# Patient Record
Sex: FEMALE | Race: WHITE | NOT HISPANIC OR LATINO | URBAN - METROPOLITAN AREA
[De-identification: names, ages, dates, MRNs, and addresses within clinical notes are randomized per-mention and may not be internally consistent; named-entity substitution may affect disease eponyms.]

---

## 2017-01-18 ENCOUNTER — COMMUNICATION - HEALTHEAST (OUTPATIENT)
Dept: FAMILY MEDICINE | Facility: CLINIC | Age: 47
End: 2017-01-18

## 2017-01-18 DIAGNOSIS — R87.612 LGSIL ON PAP SMEAR OF CERVIX: ICD-10-CM

## 2017-01-20 ENCOUNTER — HOSPITAL ENCOUNTER (OUTPATIENT)
Dept: MAMMOGRAPHY | Facility: CLINIC | Age: 47
Discharge: HOME | End: 2017-01-20
Attending: FAMILY MEDICINE

## 2017-01-20 DIAGNOSIS — Z12.31 VISIT FOR SCREENING MAMMOGRAM: ICD-10-CM

## 2017-01-24 ENCOUNTER — COMMUNICATION - HEALTHEAST (OUTPATIENT)
Dept: FAMILY MEDICINE | Facility: CLINIC | Age: 47
End: 2017-01-24

## 2017-01-24 DIAGNOSIS — R87.612 PAP SMEAR ABNORMALITY OF CERVIX WITH LGSIL: ICD-10-CM

## 2017-02-23 ENCOUNTER — AMBULATORY - HEALTHEAST (OUTPATIENT)
Dept: OBGYN | Facility: CLINIC | Age: 47
End: 2017-02-23

## 2017-02-23 DIAGNOSIS — Z01.812 PRE-PROCEDURE LAB EXAM: ICD-10-CM

## 2017-02-23 DIAGNOSIS — R87.612 PAP SMEAR ABNORMALITY OF CERVIX WITH LGSIL: ICD-10-CM

## 2017-02-23 ASSESSMENT — MIFFLIN-ST. JEOR: SCORE: 1453.22

## 2017-03-23 ENCOUNTER — COMMUNICATION - HEALTHEAST (OUTPATIENT)
Dept: HEALTH INFORMATION MANAGEMENT | Facility: CLINIC | Age: 47
End: 2017-03-23

## 2017-08-01 ENCOUNTER — OFFICE VISIT - HEALTHEAST (OUTPATIENT)
Dept: FAMILY MEDICINE | Facility: CLINIC | Age: 47
End: 2017-08-01

## 2017-08-01 DIAGNOSIS — J20.9 ACUTE BRONCHITIS: ICD-10-CM

## 2017-08-01 DIAGNOSIS — R09.89 ABNORMAL LUNG SOUNDS: ICD-10-CM

## 2017-08-24 ENCOUNTER — OFFICE VISIT - HEALTHEAST (OUTPATIENT)
Dept: FAMILY MEDICINE | Facility: CLINIC | Age: 47
End: 2017-08-24

## 2017-08-24 DIAGNOSIS — J20.9 ACUTE BRONCHITIS, UNSPECIFIED ORGANISM: ICD-10-CM

## 2017-09-24 ENCOUNTER — RADIANT APPOINTMENT (OUTPATIENT)
Dept: GENERAL RADIOLOGY | Facility: CLINIC | Age: 47
End: 2017-09-24
Attending: PHYSICIAN ASSISTANT
Payer: COMMERCIAL

## 2017-09-24 ENCOUNTER — OFFICE VISIT (OUTPATIENT)
Dept: URGENT CARE | Facility: URGENT CARE | Age: 47
End: 2017-09-24
Payer: COMMERCIAL

## 2017-09-24 VITALS
RESPIRATION RATE: 16 BRPM | DIASTOLIC BLOOD PRESSURE: 66 MMHG | HEART RATE: 82 BPM | WEIGHT: 172 LBS | HEIGHT: 69 IN | TEMPERATURE: 98 F | BODY MASS INDEX: 25.48 KG/M2 | SYSTOLIC BLOOD PRESSURE: 110 MMHG | OXYGEN SATURATION: 95 %

## 2017-09-24 DIAGNOSIS — M79.5 FOREIGN BODY (FB) IN SOFT TISSUE: ICD-10-CM

## 2017-09-24 DIAGNOSIS — M79.5 FOREIGN BODY (FB) IN SOFT TISSUE: Primary | ICD-10-CM

## 2017-09-24 PROCEDURE — 99202 OFFICE O/P NEW SF 15 MIN: CPT | Performed by: PHYSICIAN ASSISTANT

## 2017-09-24 PROCEDURE — 73660 X-RAY EXAM OF TOE(S): CPT | Mod: RT

## 2017-09-24 NOTE — MR AVS SNAPSHOT
After Visit Summary   9/24/2017    Maura Elder    MRN: [id number]           Patient Information     Date Of Birth          1970        Visit Information        Provider Department      9/24/2017 7:20 PM Marcia Singh PA-C [facility]        Today's Diagnoses     Foreign body (FB) in soft tissue    -  1      Care Instructions      Foreign Object Under the Skin (Removed)  An object has been removed from under your skin. Although care was taken to remove all of it, there is always a chance that a small piece may have been left behind.  Most skin wounds heal without problems. But there can be an increased risk of infection if anything stays under the skin. Sometimes the pieces work their way out on their own, and sometimes they can cause an infection. Very small pieces that stay under the skin usually don t cause a problem or need further treatment.  Home care  Wound care    Keep the wound clean and dry.    If there is a dressing or bandage, change it when it gets wet or dirty. Otherwise, leave it on for the first 24 hours, then change it once a day or as often as you were instructed.    If stitches or staples were used, clean the wound every day:    After taking off the dressing, wash the area gently with soap and water.    Apply a thin layer of antibiotic ointment to the cut. This will keep the wound clean and make it easier to remove the stitches. If it is oozing a lot, you can put a nonstick dressing over it. Then reapply the bandage or dressing as you were instructed.    You can get it wet, just like when you clean it. This means you can shower as usual for the first 24 hours, but do not soak the area in water (no baths or swimming) until the stitches or staples are taken out.    If surgical tape or strips were used, keep the area clean and dry. If it becomes wet, blot it dry with a towel.    Medicine    You can take over-the-counter medicine for pain,  unless you were given a different pain medicine to use. If you have chronic liver or kidney disease or ever had a stomach ulcer, or gastrointestinal bleeding or are taking blood thinner medicines, talk with your healthcare provider before using these medicines.    If you were given antibiotics, take them until they are used up. It is important to finish the antibiotics even if the wound looks better to make sure the infection clears.  Follow-up care  Follow up your healthcare provider, or as advised. Keep in mind the following:    Watch for any signs of infection, such as increasing pain, redness, swelling, or pus drainage. If this happens, don t wait for your scheduled visit, rather see your healthcare provider sooner.    Stitches or staples are usually taken out within 5 to 14 days. This varies depending on what part of your body they are on, and the type of wound. The healthcare provider will tell you how long they should be left in.    If surgical tape or strips were used, they are usually left on for 7 to 10 days. You can remove them after that unless you were told otherwise. If you try to remove them, and it is too difficult, soaking can help. If the edges of the cut pull apart, then stop removing the tape, and follow up with your healthcare provider.    If X-rays were taken, you will be told if there are new findings that may affect your care.  When to seek medical care  Call your healthcare provider right away if any of these occur:    Fever of 100.4 F (38 C) or higher, or as directed by your healthcare provider    Increasing pain in the wound    Redness, swelling or pus coming from the wound  Date Last Reviewed: 10/1/2016    0204-4274 [employer] [address] [place] [address], [place] [address]. All rights reserved. This information is not intended as a substitute for professional medical care. Always follow your healthcare professional's instructions.                Follow-ups after your visit       "  Your next 10 appointments already scheduled     Sep 24, 2017  8:00 PM CDT   XR FOOT RIGHT G/E 3 VIEWS with HPXR1   [facility] ([facility])    [address] [address] [phone]           Please bring a list of your current medicines to your exam. (Include vitamins, minerals and over-thecounter medicines.) Leave your valuables at home.  Tell your doctor if there is a chance you may be pregnant.  You do not need to do anything special for this exam.              Who to contact     If you have questions or need follow up information about today's clinic visit or your schedule please contact [facility] directly at [phone].  Normal or non-critical lab and imaging results will be communicated to you by MyChart, letter or phone within 4 business days after the clinic has received the results. If you do not hear from us within 7 days, please contact the clinic through [employer] or phone. If you have a critical or abnormal lab result, we will notify you by phone as soon as possible.  Submit refill requests through [employer] or call your pharmacy and they will forward the refill request to us. Please allow 3 business days for your refill to be completed.          Additional Information About Your Visit        [employer] Information     [employer] lets you send messages to your doctor, view your test results, renew your prescriptions, schedule appointments and more. To sign up, go to [email or web address] . Click on \"Log in\" on the left side of the screen, which will take you to the Welcome page. Then click on \"Sign up Now\" on the right side of the page.     You will be asked to enter the access code listed below, as well as some personal information. Please follow the directions to create your username and password.     Your access code is: [email or web address]  Expires: 2017  7:59 PM     Your access code will  in 90 days. If you need " "help or a new code, please call your [facility] clinic or [phone].        Care EveryWhere ID     This is your Care EveryWhere ID. This could be used by other organizations to access your [facility] medical records  [id number]        Your Vitals Were     Pulse Temperature Respirations Height Last Period Pulse Oximetry    82 98  F (36.7  C) (Tympanic) 16 5' 8.5\" (1.74 m) 09/07/2017 95%    BMI (Body Mass Index)                   25.77 kg/m2            Blood Pressure from Last 3 Encounters:   09/24/17 110/66   02/02/13 92/52    Weight from Last 3 Encounters:   09/24/17 172 lb (78 kg)   02/02/13 160 lb (72.6 kg)               Primary Care Provider    None Specified       No primary provider on file.        Equal Access to Services     PAULA WATTS : Matteo Wilder, wakristie luqadaha, qaybta kaalmaangelina mott, kyleigh moreno . [facility] [phone].    ATENCIÓN: Si habla español, tiene a lester disposición servicios gratuitos de asistencia lingüística. Llame al [phone].    We comply with applicable federal civil rights laws and [place] laws. We do not discriminate on the basis of race, color, national origin, age, disability sex, sexual orientation or gender identity.            Thank you!     Thank you for choosing [facility]  for your care. Our goal is always to provide you with excellent care. Hearing back from our patients is one way we can continue to improve our services. Please take a few minutes to complete the written survey that you may receive in the mail after your visit with us. Thank you!             Your Updated Medication List - Protect others around you: Learn how to safely use, store and throw away your medicines at www.disposemymeds.org.          This list is accurate as of: 9/24/17  7:59 PM.  Always use your most recent med list.                   Brand Name Dispense Instructions for use Diagnosis    oseltamivir 75 MG capsule    " TAMIFLU    10 capsule    Take 1 capsule by mouth 2 times daily.    Influenza A       PREVIFEM 0.25-35 MG-MCG per tablet   Generic drug:  norgestimate-ethinyl estradiol      Take 1 tablet by mouth daily.

## 2017-09-25 NOTE — NURSING NOTE
"Chief Complaint   Patient presents with     Urgent Care     Musculoskeletal Problem     Happened today stepped on glass on right foot unable to take it out but can feel it      /66  Pulse 82  Temp 98  F (36.7  C) (Tympanic)  Resp 16  Ht 5' 8.5\" (1.74 m)  Wt 172 lb (78 kg)  LMP 09/07/2017  SpO2 95%  BMI 25.77 kg/m2 Estimated body mass index is 25.77 kg/(m^2) as calculated from the following:    Height as of this encounter: 5' 8.5\" (1.74 m).    Weight as of this encounter: 172 lb (78 kg).  bp completed using cuff size: regular       Health Maintenance addressed:  NONE    n/a    Gale Gan MA     "

## 2017-09-25 NOTE — PROGRESS NOTES
"SUBJECTIVE:  Chief Complaint   Patient presents with     Urgent Care     Musculoskeletal Problem     Happened today stepped on glass on right foot unable to take it out but can feel it      Maura Elder is a 47 year old female who presents with a chief complaint of right foot pain.  Symptoms began today. She believes that she may have steeped on a sliver of glass. Her  tried to dig it out, but he was unsuccessful. She is having pain on the plantar aspect of her right foot.  Pain exacerbated by walking Relieved by rest.  She treated it initially with exploration.     Patient is UTD on tetanus.   History reviewed. No pertinent past medical history.  Current Outpatient Prescriptions   Medication Sig Dispense Refill     norgestimate-ethinyl estradiol (PREVIFEM) 0.25-35 MG-MCG tablet Take 1 tablet by mouth daily.       oseltamivir (TAMIFLU) 75 MG capsule Take 1 capsule by mouth 2 times daily. 10 capsule 0     Social History   Substance Use Topics     Smoking status: Never Smoker     Smokeless tobacco: Never Used     Alcohol use Not on file       ROS:  Review of systems negative except as stated below    EXAM:   /66  Pulse 82  Temp 98  F (36.7  C) (Tympanic)  Resp 16  Ht 5' 8.5\" (1.74 m)  Wt 172 lb (78 kg)  LMP 09/07/2017  SpO2 95%  BMI 25.77 kg/m2  M/S Exam:right foot piece of glass visualized, and removed. Unable to visualize glass after removal. tenderness to palpationGENERAL APPEARANCE: healthy, alert and no distress  EXTREMITIES: peripheral pulses normal  SKIN: no suspicious lesions or rashes  NEURO: Normal strength and tone, sensory exam grossly normal, mentation intact and speech normal    X-RAY was done -- No foreign body visualized    ASSESSMENT/PLAN:  1. Foreign body (FB) in soft tissue  Warm soaks and bacitracin until healed. I believe that the full piece of glass was removed, although it may be possible that part of it is still in foot.   - XR Foot Right G/E 3 Views; Future    Marcia " MONROE Singh

## 2017-09-25 NOTE — PATIENT INSTRUCTIONS
Foreign Object Under the Skin (Removed)  An object has been removed from under your skin. Although care was taken to remove all of it, there is always a chance that a small piece may have been left behind.  Most skin wounds heal without problems. But there can be an increased risk of infection if anything stays under the skin. Sometimes the pieces work their way out on their own, and sometimes they can cause an infection. Very small pieces that stay under the skin usually don t cause a problem or need further treatment.  Home care  Wound care    Keep the wound clean and dry.    If there is a dressing or bandage, change it when it gets wet or dirty. Otherwise, leave it on for the first 24 hours, then change it once a day or as often as you were instructed.    If stitches or staples were used, clean the wound every day:    After taking off the dressing, wash the area gently with soap and water.    Apply a thin layer of antibiotic ointment to the cut. This will keep the wound clean and make it easier to remove the stitches. If it is oozing a lot, you can put a nonstick dressing over it. Then reapply the bandage or dressing as you were instructed.    You can get it wet, just like when you clean it. This means you can shower as usual for the first 24 hours, but do not soak the area in water (no baths or swimming) until the stitches or staples are taken out.    If surgical tape or strips were used, keep the area clean and dry. If it becomes wet, blot it dry with a towel.    Medicine    You can take over-the-counter medicine for pain, unless you were given a different pain medicine to use. If you have chronic liver or kidney disease or ever had a stomach ulcer, or gastrointestinal bleeding or are taking blood thinner medicines, talk with your healthcare provider before using these medicines.    If you were given antibiotics, take them until they are used up. It is important to finish the antibiotics even if the wound  looks better to make sure the infection clears.  Follow-up care  Follow up your healthcare provider, or as advised. Keep in mind the following:    Watch for any signs of infection, such as increasing pain, redness, swelling, or pus drainage. If this happens, don t wait for your scheduled visit, rather see your healthcare provider sooner.    Stitches or staples are usually taken out within 5 to 14 days. This varies depending on what part of your body they are on, and the type of wound. The healthcare provider will tell you how long they should be left in.    If surgical tape or strips were used, they are usually left on for 7 to 10 days. You can remove them after that unless you were told otherwise. If you try to remove them, and it is too difficult, soaking can help. If the edges of the cut pull apart, then stop removing the tape, and follow up with your healthcare provider.    If X-rays were taken, you will be told if there are new findings that may affect your care.  When to seek medical care  Call your healthcare provider right away if any of these occur:    Fever of 100.4 F (38 C) or higher, or as directed by your healthcare provider    Increasing pain in the wound    Redness, swelling or pus coming from the wound  Date Last Reviewed: 10/1/2016    4007-7423 [employer]. [address] [place] [address], [place] [address]. All rights reserved. This information is not intended as a substitute for professional medical care. Always follow your healthcare professional's instructions.

## 2017-12-07 ENCOUNTER — AMBULATORY - HEALTHEAST (OUTPATIENT)
Dept: NURSING | Facility: CLINIC | Age: 47
End: 2017-12-07

## 2017-12-07 ENCOUNTER — RECORDS - HEALTHEAST (OUTPATIENT)
Dept: ADMINISTRATIVE | Facility: OTHER | Age: 47
End: 2017-12-07

## 2017-12-07 DIAGNOSIS — Z00.00 PREVENTATIVE HEALTH CARE: ICD-10-CM

## 2017-12-22 ENCOUNTER — OFFICE VISIT - HEALTHEAST (OUTPATIENT)
Dept: FAMILY MEDICINE | Facility: CLINIC | Age: 47
End: 2017-12-22

## 2017-12-22 DIAGNOSIS — Z00.00 ROUTINE GENERAL MEDICAL EXAMINATION AT A HEALTH CARE FACILITY: ICD-10-CM

## 2017-12-22 ASSESSMENT — MIFFLIN-ST. JEOR: SCORE: 1464.56

## 2017-12-27 LAB
HUMAN PAPILLOMA VIRUS 16 DNA: NEGATIVE
HUMAN PAPILLOMA VIRUS 18 DNA: NEGATIVE
HUMAN PAPILLOMA VIRUS FINAL DIAGNOSIS: NORMAL
HUMAN PAPILLOMA VIRUS OTHER HR: NEGATIVE
SPECIMEN DESCRIPTION: NORMAL

## 2018-01-02 LAB
BKR LAB AP ABNORMAL BLEEDING: NO
BKR LAB AP BIRTH CONTROL/HORMONES: NORMAL
BKR LAB AP CERVICAL APPEARANCE: NORMAL
BKR LAB AP GYN ADEQUACY: NORMAL
BKR LAB AP GYN INTERPRETATION: NORMAL
BKR LAB AP HPV REFLEX: NORMAL
BKR LAB AP LMP: NORMAL
BKR LAB AP PATIENT STATUS: NORMAL
BKR LAB AP PREVIOUS ABNORMAL: 2016
BKR LAB AP PREVIOUS NORMAL: 2011
HIGH RISK?: YES
PATH REPORT.COMMENTS IMP SPEC: NORMAL
RESULT FLAG (HE HISTORICAL CONVERSION): NORMAL

## 2018-05-23 ENCOUNTER — COMMUNICATION - HEALTHEAST (OUTPATIENT)
Dept: INTERNAL MEDICINE | Facility: CLINIC | Age: 48
End: 2018-05-23

## 2018-05-23 ENCOUNTER — OFFICE VISIT - HEALTHEAST (OUTPATIENT)
Dept: FAMILY MEDICINE | Facility: CLINIC | Age: 48
End: 2018-05-23

## 2018-05-23 ENCOUNTER — COMMUNICATION - HEALTHEAST (OUTPATIENT)
Dept: FAMILY MEDICINE | Facility: CLINIC | Age: 48
End: 2018-05-23

## 2018-05-23 DIAGNOSIS — R19.5 ABNORMAL STOOLS: ICD-10-CM

## 2018-05-23 LAB
ALBUMIN SERPL-MCNC: 4.1 G/DL (ref 3.5–5)
ALP SERPL-CCNC: 75 U/L (ref 45–120)
ALT SERPL W P-5'-P-CCNC: 15 U/L (ref 0–45)
ANION GAP SERPL CALCULATED.3IONS-SCNC: 10 MMOL/L (ref 5–18)
AST SERPL W P-5'-P-CCNC: 13 U/L (ref 0–40)
BASOPHILS # BLD AUTO: 0.1 THOU/UL (ref 0–0.2)
BASOPHILS NFR BLD AUTO: 1 % (ref 0–2)
BILIRUB SERPL-MCNC: 0.6 MG/DL (ref 0–1)
BUN SERPL-MCNC: 15 MG/DL (ref 8–22)
CALCIUM SERPL-MCNC: 9.4 MG/DL (ref 8.5–10.5)
CHLORIDE BLD-SCNC: 104 MMOL/L (ref 98–107)
CO2 SERPL-SCNC: 27 MMOL/L (ref 22–31)
CREAT SERPL-MCNC: 0.75 MG/DL (ref 0.6–1.1)
EOSINOPHIL # BLD AUTO: 0.1 THOU/UL (ref 0–0.4)
EOSINOPHIL NFR BLD AUTO: 1 % (ref 0–6)
ERYTHROCYTE [DISTWIDTH] IN BLOOD BY AUTOMATED COUNT: 12 % (ref 11–14.5)
GFR SERPL CREATININE-BSD FRML MDRD: >60 ML/MIN/1.73M2
GLUCOSE BLD-MCNC: 96 MG/DL (ref 70–125)
HCT VFR BLD AUTO: 37.5 % (ref 35–47)
HGB BLD-MCNC: 12.6 G/DL (ref 12–16)
LYMPHOCYTES # BLD AUTO: 5.2 THOU/UL (ref 0.8–4.4)
LYMPHOCYTES NFR BLD AUTO: 53 % (ref 20–40)
MCH RBC QN AUTO: 30.3 PG (ref 27–34)
MCHC RBC AUTO-ENTMCNC: 33.6 G/DL (ref 32–36)
MCV RBC AUTO: 90 FL (ref 80–100)
MONOCYTES # BLD AUTO: 0.4 THOU/UL (ref 0–0.9)
MONOCYTES NFR BLD AUTO: 4 % (ref 2–10)
NEUTROPHILS # BLD AUTO: 4.1 THOU/UL (ref 2–7.7)
NEUTROPHILS NFR BLD AUTO: 41 % (ref 50–70)
PLATELET # BLD AUTO: 266 THOU/UL (ref 140–440)
PMV BLD AUTO: 6.8 FL (ref 7–10)
POTASSIUM BLD-SCNC: 4.1 MMOL/L (ref 3.5–5)
PROT SERPL-MCNC: 6.1 G/DL (ref 6–8)
RBC # BLD AUTO: 4.16 MILL/UL (ref 3.8–5.4)
SODIUM SERPL-SCNC: 141 MMOL/L (ref 136–145)
WBC: 9.8 THOU/UL (ref 4–11)

## 2018-05-24 ENCOUNTER — AMBULATORY - HEALTHEAST (OUTPATIENT)
Dept: LAB | Facility: CLINIC | Age: 48
End: 2018-05-24

## 2018-05-24 DIAGNOSIS — R19.5 ABNORMAL STOOLS: ICD-10-CM

## 2018-05-25 LAB — O+P STL MICRO: NORMAL

## 2018-11-29 ENCOUNTER — AMBULATORY - HEALTHEAST (OUTPATIENT)
Dept: NURSING | Facility: CLINIC | Age: 48
End: 2018-11-29

## 2019-01-09 ENCOUNTER — OFFICE VISIT - HEALTHEAST (OUTPATIENT)
Dept: FAMILY MEDICINE | Facility: CLINIC | Age: 49
End: 2019-01-09

## 2019-01-09 DIAGNOSIS — M77.11 LATERAL EPICONDYLITIS OF RIGHT ELBOW: ICD-10-CM

## 2019-01-15 ENCOUNTER — COMMUNICATION - HEALTHEAST (OUTPATIENT)
Dept: FAMILY MEDICINE | Facility: CLINIC | Age: 49
End: 2019-01-15

## 2019-02-06 ENCOUNTER — OFFICE VISIT - HEALTHEAST (OUTPATIENT)
Dept: FAMILY MEDICINE | Facility: CLINIC | Age: 49
End: 2019-02-06

## 2019-02-06 DIAGNOSIS — Z12.31 VISIT FOR SCREENING MAMMOGRAM: ICD-10-CM

## 2019-02-06 DIAGNOSIS — Z13.1 SCREENING FOR DIABETES MELLITUS: ICD-10-CM

## 2019-02-06 DIAGNOSIS — Z00.00 ROUTINE GENERAL MEDICAL EXAMINATION AT A HEALTH CARE FACILITY: ICD-10-CM

## 2019-02-06 DIAGNOSIS — E78.5 DYSLIPIDEMIA: ICD-10-CM

## 2019-02-06 DIAGNOSIS — R59.1 LYMPHADENOPATHY: ICD-10-CM

## 2019-02-06 LAB
BASOPHILS # BLD AUTO: 0.1 THOU/UL (ref 0–0.2)
BASOPHILS NFR BLD AUTO: 1 % (ref 0–2)
CHOLEST SERPL-MCNC: 229 MG/DL
EOSINOPHIL # BLD AUTO: 0.1 THOU/UL (ref 0–0.4)
EOSINOPHIL NFR BLD AUTO: 1 % (ref 0–6)
ERYTHROCYTE [DISTWIDTH] IN BLOOD BY AUTOMATED COUNT: 13.1 % (ref 11–14.5)
FASTING STATUS PATIENT QL REPORTED: YES
FASTING STATUS PATIENT QL REPORTED: YES
GLUCOSE BLD-MCNC: 90 MG/DL (ref 70–125)
HCT VFR BLD AUTO: 38.6 % (ref 35–47)
HDLC SERPL-MCNC: 50 MG/DL
HGB BLD-MCNC: 12.6 G/DL (ref 12–16)
LDLC SERPL CALC-MCNC: 143 MG/DL
LYMPHOCYTES # BLD AUTO: 7.1 THOU/UL (ref 0.8–4.4)
LYMPHOCYTES NFR BLD AUTO: 56 % (ref 20–40)
MCH RBC QN AUTO: 29.6 PG (ref 27–34)
MCHC RBC AUTO-ENTMCNC: 32.6 G/DL (ref 32–36)
MCV RBC AUTO: 91 FL (ref 80–100)
MONOCYTES # BLD AUTO: 0.3 THOU/UL (ref 0–0.9)
MONOCYTES NFR BLD AUTO: 3 % (ref 2–10)
NEUTROPHILS # BLD AUTO: 5 THOU/UL (ref 2–7.7)
NEUTROPHILS NFR BLD AUTO: 40 % (ref 50–70)
PLATELET # BLD AUTO: 228 THOU/UL (ref 140–440)
PMV BLD AUTO: 9.2 FL (ref 8.5–12.5)
RBC # BLD AUTO: 4.26 MILL/UL (ref 3.8–5.4)
TRIGL SERPL-MCNC: 179 MG/DL
WBC: 12.7 THOU/UL (ref 4–11)

## 2019-02-06 ASSESSMENT — MIFFLIN-ST. JEOR: SCORE: 1479.31

## 2019-02-17 ENCOUNTER — COMMUNICATION - HEALTHEAST (OUTPATIENT)
Dept: FAMILY MEDICINE | Facility: CLINIC | Age: 49
End: 2019-02-17

## 2019-02-26 ENCOUNTER — COMMUNICATION - HEALTHEAST (OUTPATIENT)
Dept: FAMILY MEDICINE | Facility: CLINIC | Age: 49
End: 2019-02-26

## 2019-04-24 ENCOUNTER — HOSPITAL ENCOUNTER (OUTPATIENT)
Dept: MAMMOGRAPHY | Facility: CLINIC | Age: 49
Discharge: HOME | End: 2019-04-24
Attending: FAMILY MEDICINE

## 2019-04-24 DIAGNOSIS — Z12.31 VISIT FOR SCREENING MAMMOGRAM: ICD-10-CM

## 2019-04-26 ENCOUNTER — HOSPITAL ENCOUNTER (OUTPATIENT)
Dept: RADIOLOGY | Facility: CLINIC | Age: 49
Discharge: HOME | End: 2019-04-26
Attending: FAMILY MEDICINE

## 2019-04-26 ENCOUNTER — AMBULATORY - HEALTHEAST (OUTPATIENT)
Dept: FAMILY MEDICINE | Facility: CLINIC | Age: 49
End: 2019-04-26

## 2019-04-26 ENCOUNTER — COMMUNICATION - HEALTHEAST (OUTPATIENT)
Dept: FAMILY MEDICINE | Facility: CLINIC | Age: 49
End: 2019-04-26

## 2019-04-26 ENCOUNTER — AMBULATORY - HEALTHEAST (OUTPATIENT)
Dept: LAB | Facility: CLINIC | Age: 49
End: 2019-04-26

## 2019-04-26 ENCOUNTER — HOSPITAL ENCOUNTER (OUTPATIENT)
Dept: ULTRASOUND IMAGING | Facility: CLINIC | Age: 49
Discharge: HOME | End: 2019-04-26
Attending: FAMILY MEDICINE

## 2019-04-26 DIAGNOSIS — R59.9 ENLARGED LYMPH NODES: ICD-10-CM

## 2019-04-26 DIAGNOSIS — R05.9 COUGH: ICD-10-CM

## 2019-04-26 DIAGNOSIS — R59.1 LYMPHADENOPATHY: ICD-10-CM

## 2019-04-26 LAB
BASOPHILS # BLD AUTO: 0.1 THOU/UL (ref 0–0.2)
BASOPHILS NFR BLD AUTO: 0 % (ref 0–2)
EOSINOPHIL # BLD AUTO: 0.1 THOU/UL (ref 0–0.4)
EOSINOPHIL NFR BLD AUTO: 1 % (ref 0–6)
ERYTHROCYTE [DISTWIDTH] IN BLOOD BY AUTOMATED COUNT: 13.2 % (ref 11–14.5)
HCT VFR BLD AUTO: 37 % (ref 35–47)
HGB BLD-MCNC: 12.3 G/DL (ref 12–16)
IGA SERPL-MCNC: 283 MG/DL
IGA SERPL-MCNC: 37 MG/DL (ref 65–400)
IGM SERPL-MCNC: 8 MG/DL (ref 60–280)
LYMPHOCYTES # BLD AUTO: 9.3 THOU/UL (ref 0.8–4.4)
LYMPHOCYTES NFR BLD AUTO: 57 % (ref 20–40)
MCH RBC QN AUTO: 29.9 PG (ref 27–34)
MCHC RBC AUTO-ENTMCNC: 33.2 G/DL (ref 32–36)
MCV RBC AUTO: 90 FL (ref 80–100)
MONOCYTES # BLD AUTO: 0.5 THOU/UL (ref 0–0.9)
MONOCYTES NFR BLD AUTO: 3 % (ref 2–10)
NEUTROPHILS # BLD AUTO: 6.2 THOU/UL (ref 2–7.7)
NEUTROPHILS NFR BLD AUTO: 38 % (ref 50–70)
PATH REPORT.MICROSCOPIC SPEC OTHER STN: ABNORMAL
PLAT MORPH BLD: NORMAL
PLATELET # BLD AUTO: 259 THOU/UL (ref 140–440)
PMV BLD AUTO: 8.9 FL (ref 8.5–12.5)
RBC # BLD AUTO: 4.12 MILL/UL (ref 3.8–5.4)
WBC: 16.3 THOU/UL (ref 4–11)

## 2019-04-28 LAB
CMV IGG SERPL IA-ACNC: 2.1 AI (ref 0–0.8)
CMV IGM SERPL IA-ACNC: 0.3 AI (ref 0–0.8)
EBV EA-D IGG SER-ACNC: 0.5 AI (ref 0–0.8)
EBV NA IGG SER IA-ACNC: >8 AI (ref 0–0.8)
EBV VCA IGG SER IA-ACNC: 0.3 AI (ref 0–0.8)
EBV VCA IGM SER IA-ACNC: 0.2 AI (ref 0–0.8)

## 2019-04-29 ENCOUNTER — AMBULATORY - HEALTHEAST (OUTPATIENT)
Dept: FAMILY MEDICINE | Facility: CLINIC | Age: 49
End: 2019-04-29

## 2019-04-29 ENCOUNTER — COMMUNICATION - HEALTHEAST (OUTPATIENT)
Dept: FAMILY MEDICINE | Facility: CLINIC | Age: 49
End: 2019-04-29

## 2019-04-29 DIAGNOSIS — D72.820 ATYPICAL LYMPHOCYTOSIS: ICD-10-CM

## 2019-04-29 DIAGNOSIS — R59.1 LYMPHADENOPATHY: ICD-10-CM

## 2019-04-29 LAB
LAB AP CHARGES (HE HISTORICAL CONVERSION): ABNORMAL
PATH REPORT.COMMENTS IMP SPEC: ABNORMAL
PATH REPORT.COMMENTS IMP SPEC: ABNORMAL
PATH REPORT.FINAL DX SPEC: ABNORMAL
PATH REPORT.MICROSCOPIC SPEC OTHER STN: ABNORMAL
PATH REPORT.RELEVANT HX SPEC: ABNORMAL
RESULT FLAG (HE HISTORICAL CONVERSION): ABNORMAL

## 2019-04-30 ENCOUNTER — COMMUNICATION - HEALTHEAST (OUTPATIENT)
Dept: ONCOLOGY | Facility: HOSPITAL | Age: 49
End: 2019-04-30

## 2019-04-30 ENCOUNTER — AMBULATORY - HEALTHEAST (OUTPATIENT)
Dept: ONCOLOGY | Facility: HOSPITAL | Age: 49
End: 2019-04-30

## 2019-04-30 DIAGNOSIS — D72.820 ATYPICAL LYMPHOCYTOSIS: ICD-10-CM

## 2019-05-01 ENCOUNTER — OFFICE VISIT - HEALTHEAST (OUTPATIENT)
Dept: FAMILY MEDICINE | Facility: CLINIC | Age: 49
End: 2019-05-01

## 2019-05-01 DIAGNOSIS — I88.9 AXILLARY LYMPHADENITIS: ICD-10-CM

## 2019-05-01 DIAGNOSIS — J01.90 ACUTE SINUSITIS WITH SYMPTOMS > 10 DAYS: ICD-10-CM

## 2019-05-01 DIAGNOSIS — D72.820 ATYPICAL LYMPHOCYTOSIS: ICD-10-CM

## 2019-05-01 LAB
ALBUMIN SERPL-MCNC: 4.5 G/DL (ref 3.5–5)
ALP SERPL-CCNC: 65 U/L (ref 45–120)
ALT SERPL W P-5'-P-CCNC: 13 U/L (ref 0–45)
ANION GAP SERPL CALCULATED.3IONS-SCNC: 12 MMOL/L (ref 5–18)
AST SERPL W P-5'-P-CCNC: 11 U/L (ref 0–40)
BILIRUB SERPL-MCNC: 0.5 MG/DL (ref 0–1)
BUN SERPL-MCNC: 13 MG/DL (ref 8–22)
CALCIUM SERPL-MCNC: 10.1 MG/DL (ref 8.5–10.5)
CHLORIDE BLD-SCNC: 105 MMOL/L (ref 98–107)
CO2 SERPL-SCNC: 24 MMOL/L (ref 22–31)
CREAT SERPL-MCNC: 0.72 MG/DL (ref 0.6–1.1)
GFR SERPL CREATININE-BSD FRML MDRD: >60 ML/MIN/1.73M2
GLUCOSE BLD-MCNC: 94 MG/DL (ref 70–125)
LDH SERPL L TO P-CCNC: 136 U/L (ref 125–220)
POTASSIUM BLD-SCNC: 4 MMOL/L (ref 3.5–5)
PROT SERPL-MCNC: 6.4 G/DL (ref 6–8)
SODIUM SERPL-SCNC: 141 MMOL/L (ref 136–145)

## 2019-05-02 LAB
B BURGDOR IGG+IGM SER QL: <0.01 INDEX VALUE
LAB AP CHARGES (HE HISTORICAL CONVERSION): ABNORMAL
PATH REPORT.COMMENTS IMP SPEC: ABNORMAL
PATH REPORT.COMMENTS IMP SPEC: ABNORMAL
PATH REPORT.FINAL DX SPEC: ABNORMAL
PATH REPORT.MICROSCOPIC SPEC OTHER STN: ABNORMAL
RESULT FLAG (HE HISTORICAL CONVERSION): ABNORMAL

## 2019-05-03 LAB
GAMMA INTERFERON BACKGROUND BLD IA-ACNC: 0.03 IU/ML
M TB IFN-G BLD-IMP: NEGATIVE
MITOGEN IGNF BCKGRD COR BLD-ACNC: 0 IU/ML
MITOGEN IGNF BCKGRD COR BLD-ACNC: 0 IU/ML
QTF INTERPRETATION: NORMAL
QTF MITOGEN - NIL: 7.55 IU/ML

## 2019-05-07 ENCOUNTER — AMBULATORY - HEALTHEAST (OUTPATIENT)
Dept: INFUSION THERAPY | Facility: HOSPITAL | Age: 49
End: 2019-05-07

## 2019-05-07 ENCOUNTER — OFFICE VISIT - HEALTHEAST (OUTPATIENT)
Dept: ONCOLOGY | Facility: HOSPITAL | Age: 49
End: 2019-05-07

## 2019-05-07 DIAGNOSIS — R59.1 LYMPHADENOPATHY: ICD-10-CM

## 2019-05-07 DIAGNOSIS — D72.820 ATYPICAL LYMPHOCYTOSIS: ICD-10-CM

## 2019-05-07 DIAGNOSIS — C91.10 CLL (CHRONIC LYMPHOCYTIC LEUKEMIA) (H): ICD-10-CM

## 2019-05-07 ASSESSMENT — MIFFLIN-ST. JEOR: SCORE: 1457.31

## 2019-05-08 LAB
ALBUMIN PERCENT: 71.5 % (ref 51–67)
ALBUMIN SERPL ELPH-MCNC: 4.6 G/DL (ref 3.2–4.7)
ALPHA 1 PERCENT: 3 % (ref 2–4)
ALPHA 2 PERCENT: 10.5 % (ref 5–13)
ALPHA1 GLOB SERPL ELPH-MCNC: 0.2 G/DL (ref 0.1–0.3)
ALPHA2 GLOB SERPL ELPH-MCNC: 0.7 G/DL (ref 0.4–0.9)
B-GLOBULIN SERPL ELPH-MCNC: 0.7 G/DL (ref 0.7–1.2)
BETA PERCENT: 11.2 % (ref 10–17)
GAMMA GLOB SERPL ELPH-MCNC: 0.2 G/DL (ref 0.6–1.4)
GAMMA GLOBULIN PERCENT: 3.8 % (ref 9–20)
PATH ICD:: ABNORMAL
PATH ICD:: NORMAL
PROT PATTERN SERPL ELPH-IMP: ABNORMAL
PROT PATTERN SERPL IFE-IMP: NORMAL
PROT SERPL-MCNC: 6.4 G/DL (ref 6–8)
REVIEWING PATHOLOGIST: ABNORMAL
REVIEWING PATHOLOGIST: NORMAL

## 2019-05-09 LAB — B2 MICROGLOB TUMOR MARKER SER-MCNC: 2.4 MG/L

## 2019-05-11 LAB
MISCELLANEOUS TEST DEPT. - HE HISTORICAL: NORMAL
PERFORMING LAB: NORMAL
SPECIMEN STATUS: NORMAL
TEST NAME: NORMAL

## 2019-05-16 ENCOUNTER — HOSPITAL ENCOUNTER (OUTPATIENT)
Dept: CT IMAGING | Facility: HOSPITAL | Age: 49
Setting detail: RADIATION/ONCOLOGY SERIES
Discharge: STILL ADMITTED OR ADMINISTRATIVE | End: 2019-05-16
Attending: INTERNAL MEDICINE

## 2019-05-16 ENCOUNTER — TRANSFERRED RECORDS (OUTPATIENT)
Dept: HEALTH INFORMATION MANAGEMENT | Facility: CLINIC | Age: 49
End: 2019-05-16

## 2019-05-16 DIAGNOSIS — C91.10 CLL (CHRONIC LYMPHOCYTIC LEUKEMIA) (H): ICD-10-CM

## 2019-05-16 DIAGNOSIS — C91.90 LYMPHOID LEUKEMIA, UNSPECIFIED NOT HAVING ACHIEVED REMISSION (H): ICD-10-CM

## 2019-05-20 ENCOUNTER — OFFICE VISIT - HEALTHEAST (OUTPATIENT)
Dept: ONCOLOGY | Facility: HOSPITAL | Age: 49
End: 2019-05-20

## 2019-05-20 DIAGNOSIS — C91.10 CLL (CHRONIC LYMPHOCYTIC LEUKEMIA) (H): ICD-10-CM

## 2019-05-23 ENCOUNTER — COMMUNICATION - HEALTHEAST (OUTPATIENT)
Dept: ONCOLOGY | Facility: HOSPITAL | Age: 49
End: 2019-05-23

## 2019-05-24 ENCOUNTER — AMBULATORY - HEALTHEAST (OUTPATIENT)
Dept: ONCOLOGY | Facility: HOSPITAL | Age: 49
End: 2019-05-24

## 2019-05-24 ENCOUNTER — COMMUNICATION - HEALTHEAST (OUTPATIENT)
Dept: FAMILY MEDICINE | Facility: CLINIC | Age: 49
End: 2019-05-24

## 2019-05-24 DIAGNOSIS — I72.0 ANEURYSM OF CAROTID ARTERY (H): ICD-10-CM

## 2019-05-28 ENCOUNTER — COMMUNICATION - HEALTHEAST (OUTPATIENT)
Dept: FAMILY MEDICINE | Facility: CLINIC | Age: 49
End: 2019-05-28

## 2019-05-29 ENCOUNTER — HOSPITAL ENCOUNTER (OUTPATIENT)
Dept: CT IMAGING | Facility: CLINIC | Age: 49
Setting detail: RADIATION/ONCOLOGY SERIES
Discharge: STILL ADMITTED OR ADMINISTRATIVE | End: 2019-05-29
Attending: INTERNAL MEDICINE

## 2019-05-29 DIAGNOSIS — I72.0 ANEURYSM OF CAROTID ARTERY (H): ICD-10-CM

## 2019-05-30 ENCOUNTER — AMBULATORY - HEALTHEAST (OUTPATIENT)
Dept: ONCOLOGY | Facility: HOSPITAL | Age: 49
End: 2019-05-30

## 2019-06-02 ENCOUNTER — COMMUNICATION - HEALTHEAST (OUTPATIENT)
Dept: ONCOLOGY | Facility: HOSPITAL | Age: 49
End: 2019-06-02

## 2019-06-03 ENCOUNTER — COMMUNICATION - HEALTHEAST (OUTPATIENT)
Dept: NEUROLOGY | Facility: CLINIC | Age: 49
End: 2019-06-03

## 2019-06-03 DIAGNOSIS — Q28.2 ARTERIOVENOUS MALFORMATION OF BRAIN: ICD-10-CM

## 2019-06-05 ENCOUNTER — OFFICE VISIT - HEALTHEAST (OUTPATIENT)
Dept: FAMILY MEDICINE | Facility: CLINIC | Age: 49
End: 2019-06-05

## 2019-06-05 DIAGNOSIS — F51.02 ADJUSTMENT INSOMNIA: ICD-10-CM

## 2019-06-05 ASSESSMENT — MIFFLIN-ST. JEOR: SCORE: 1462.29

## 2019-06-10 ENCOUNTER — RECORDS - HEALTHEAST (OUTPATIENT)
Dept: ADMINISTRATIVE | Facility: OTHER | Age: 49
End: 2019-06-10

## 2019-06-18 ENCOUNTER — COMMUNICATION - HEALTHEAST (OUTPATIENT)
Dept: ONCOLOGY | Facility: HOSPITAL | Age: 49
End: 2019-06-18

## 2019-06-21 ENCOUNTER — HOSPITAL ENCOUNTER (OUTPATIENT)
Dept: INTERVENTIONAL RADIOLOGY/VASCULAR | Facility: CLINIC | Age: 49
Discharge: HOME | End: 2019-06-21
Admitting: RADIOLOGY

## 2019-06-21 ENCOUNTER — COMMUNICATION - HEALTHEAST (OUTPATIENT)
Dept: NEUROSURGERY | Facility: CLINIC | Age: 49
End: 2019-06-21

## 2019-06-21 ENCOUNTER — TRANSFERRED RECORDS (OUTPATIENT)
Dept: HEALTH INFORMATION MANAGEMENT | Facility: CLINIC | Age: 49
End: 2019-06-21

## 2019-06-21 DIAGNOSIS — Q28.2 ARTERIOVENOUS MALFORMATION OF BRAIN: ICD-10-CM

## 2019-06-21 LAB
CREAT SERPL-MCNC: 0.73 MG/DL (ref 0.6–1.1)
GFR SERPL CREATININE-BSD FRML MDRD: >60 ML/MIN/1.73M2
HCG UR QL: NEGATIVE
HGB BLD-MCNC: 12.1 G/DL (ref 12–16)
PLATELET # BLD AUTO: 219 THOU/UL (ref 140–440)

## 2019-06-21 ASSESSMENT — MIFFLIN-ST. JEOR: SCORE: 1449.59

## 2019-06-25 ENCOUNTER — OFFICE VISIT - HEALTHEAST (OUTPATIENT)
Dept: NEUROSURGERY | Facility: CLINIC | Age: 49
End: 2019-06-25

## 2019-06-25 DIAGNOSIS — Q28.2 AVM (ARTERIOVENOUS MALFORMATION) BRAIN: ICD-10-CM

## 2019-06-25 DIAGNOSIS — I72.9 ANEURYSM (H): ICD-10-CM

## 2019-06-25 ASSESSMENT — MIFFLIN-ST. JEOR: SCORE: 1448.69

## 2019-06-26 ENCOUNTER — COMMUNICATION - HEALTHEAST (OUTPATIENT)
Dept: NEUROSURGERY | Facility: CLINIC | Age: 49
End: 2019-06-26

## 2019-07-03 ENCOUNTER — HOSPITAL ENCOUNTER (OUTPATIENT)
Dept: MRI IMAGING | Facility: CLINIC | Age: 49
Discharge: HOME | End: 2019-07-03
Attending: NEUROLOGICAL SURGERY

## 2019-07-03 DIAGNOSIS — I72.9 ANEURYSM (H): ICD-10-CM

## 2019-07-03 DIAGNOSIS — Q28.2 AVM (ARTERIOVENOUS MALFORMATION) BRAIN: ICD-10-CM

## 2019-07-09 ENCOUNTER — COMMUNICATION - HEALTHEAST (OUTPATIENT)
Dept: FAMILY MEDICINE | Facility: CLINIC | Age: 49
End: 2019-07-09

## 2019-07-09 DIAGNOSIS — F51.02 ADJUSTMENT INSOMNIA: ICD-10-CM

## 2019-07-15 ENCOUNTER — COMMUNICATION - HEALTHEAST (OUTPATIENT)
Dept: ADMINISTRATIVE | Facility: HOSPITAL | Age: 49
End: 2019-07-15

## 2019-07-17 ENCOUNTER — TRANSFERRED RECORDS (OUTPATIENT)
Dept: HEALTH INFORMATION MANAGEMENT | Facility: CLINIC | Age: 49
End: 2019-07-17

## 2019-07-17 ENCOUNTER — OFFICE VISIT - HEALTHEAST (OUTPATIENT)
Dept: RADIATION ONCOLOGY | Facility: HOSPITAL | Age: 49
End: 2019-07-17

## 2019-07-17 DIAGNOSIS — Q28.2 AVM (ARTERIOVENOUS MALFORMATION) BRAIN: ICD-10-CM

## 2019-07-18 ENCOUNTER — COMMUNICATION - HEALTHEAST (OUTPATIENT)
Dept: RADIATION ONCOLOGY | Facility: HOSPITAL | Age: 49
End: 2019-07-18

## 2019-07-18 ENCOUNTER — COMMUNICATION - HEALTHEAST (OUTPATIENT)
Dept: ADMINISTRATIVE | Facility: HOSPITAL | Age: 49
End: 2019-07-18

## 2019-07-23 ENCOUNTER — RECORDS - HEALTHEAST (OUTPATIENT)
Dept: ADMINISTRATIVE | Facility: OTHER | Age: 49
End: 2019-07-23

## 2019-08-12 ENCOUNTER — RECORDS - HEALTHEAST (OUTPATIENT)
Dept: ADMINISTRATIVE | Facility: OTHER | Age: 49
End: 2019-08-12

## 2019-08-13 ENCOUNTER — OFFICE VISIT - HEALTHEAST (OUTPATIENT)
Dept: NEUROSURGERY | Facility: CLINIC | Age: 49
End: 2019-08-13

## 2019-08-13 DIAGNOSIS — I72.9 ANEURYSM (H): ICD-10-CM

## 2019-08-13 DIAGNOSIS — Q28.2 AVM (ARTERIOVENOUS MALFORMATION) BRAIN: ICD-10-CM

## 2019-08-13 ASSESSMENT — MIFFLIN-ST. JEOR: SCORE: 1448.69

## 2019-08-20 ENCOUNTER — COMMUNICATION - HEALTHEAST (OUTPATIENT)
Dept: NEUROSURGERY | Facility: CLINIC | Age: 49
End: 2019-08-20

## 2019-08-21 ENCOUNTER — COMMUNICATION - HEALTHEAST (OUTPATIENT)
Dept: RADIATION ONCOLOGY | Facility: HOSPITAL | Age: 49
End: 2019-08-21

## 2019-08-21 ENCOUNTER — OFFICE VISIT - HEALTHEAST (OUTPATIENT)
Dept: ONCOLOGY | Facility: HOSPITAL | Age: 49
End: 2019-08-21

## 2019-08-21 DIAGNOSIS — Q28.2 AVM (ARTERIOVENOUS MALFORMATION) BRAIN: ICD-10-CM

## 2019-08-22 ENCOUNTER — OFFICE VISIT - HEALTHEAST (OUTPATIENT)
Dept: ONCOLOGY | Facility: HOSPITAL | Age: 49
End: 2019-08-22

## 2019-08-22 ENCOUNTER — AMBULATORY - HEALTHEAST (OUTPATIENT)
Dept: ONCOLOGY | Facility: CLINIC | Age: 49
End: 2019-08-22

## 2019-08-22 DIAGNOSIS — C91.10 CLL (CHRONIC LYMPHOCYTIC LEUKEMIA) (H): ICD-10-CM

## 2019-08-22 DIAGNOSIS — F43.25 ADJUSTMENT REACTION WITH MIXED DISTURBANCE OF EMOTIONS AND CONDUCT: ICD-10-CM

## 2019-08-22 DIAGNOSIS — Q28.2 AVM (ARTERIOVENOUS MALFORMATION) BRAIN: ICD-10-CM

## 2019-08-22 DIAGNOSIS — I72.0 ANEURYSM OF CAROTID ARTERY (H): ICD-10-CM

## 2019-08-26 ENCOUNTER — COMMUNICATION - HEALTHEAST (OUTPATIENT)
Dept: RADIATION ONCOLOGY | Facility: HOSPITAL | Age: 49
End: 2019-08-26

## 2019-08-26 ENCOUNTER — AMBULATORY - HEALTHEAST (OUTPATIENT)
Dept: RADIATION ONCOLOGY | Facility: HOSPITAL | Age: 49
End: 2019-08-26

## 2019-08-26 DIAGNOSIS — Q28.2 AVM (ARTERIOVENOUS MALFORMATION) BRAIN: ICD-10-CM

## 2019-09-06 ENCOUNTER — COMMUNICATION - HEALTHEAST (OUTPATIENT)
Dept: RADIATION ONCOLOGY | Facility: HOSPITAL | Age: 49
End: 2019-09-06

## 2019-09-06 ENCOUNTER — AMBULATORY - HEALTHEAST (OUTPATIENT)
Dept: RADIATION ONCOLOGY | Facility: HOSPITAL | Age: 49
End: 2019-09-06

## 2019-09-06 DIAGNOSIS — Q28.2 AVM (ARTERIOVENOUS MALFORMATION) BRAIN: ICD-10-CM

## 2019-09-06 LAB — HCG UR QL: NEGATIVE

## 2019-09-09 ENCOUNTER — HOSPITAL ENCOUNTER (OUTPATIENT)
Dept: CT IMAGING | Facility: CLINIC | Age: 49
Setting detail: RADIATION/ONCOLOGY SERIES
Discharge: STILL ADMITTED OR ADMINISTRATIVE | End: 2019-09-09
Attending: RADIOLOGY

## 2019-09-09 ENCOUNTER — HOSPITAL ENCOUNTER (OUTPATIENT)
Dept: MRI IMAGING | Facility: CLINIC | Age: 49
Setting detail: RADIATION/ONCOLOGY SERIES
Discharge: STILL ADMITTED OR ADMINISTRATIVE | End: 2019-09-09
Attending: RADIOLOGY

## 2019-09-09 ENCOUNTER — TRANSFERRED RECORDS (OUTPATIENT)
Dept: HEALTH INFORMATION MANAGEMENT | Facility: CLINIC | Age: 49
End: 2019-09-09

## 2019-09-09 DIAGNOSIS — Q28.2 AVM (ARTERIOVENOUS MALFORMATION) BRAIN: ICD-10-CM

## 2019-09-16 ENCOUNTER — AMBULATORY - HEALTHEAST (OUTPATIENT)
Dept: RADIATION ONCOLOGY | Facility: HOSPITAL | Age: 49
End: 2019-09-16

## 2019-09-23 ENCOUNTER — AMBULATORY - HEALTHEAST (OUTPATIENT)
Dept: RADIATION ONCOLOGY | Facility: HOSPITAL | Age: 49
End: 2019-09-23

## 2019-09-24 ENCOUNTER — AMBULATORY - HEALTHEAST (OUTPATIENT)
Dept: RADIATION ONCOLOGY | Facility: HOSPITAL | Age: 49
End: 2019-09-24

## 2019-09-24 ENCOUNTER — OFFICE VISIT - HEALTHEAST (OUTPATIENT)
Dept: RADIATION ONCOLOGY | Facility: HOSPITAL | Age: 49
End: 2019-09-24

## 2019-09-24 DIAGNOSIS — Q28.2 AVM (ARTERIOVENOUS MALFORMATION) BRAIN: ICD-10-CM

## 2019-09-24 LAB — HCG UR QL: NEGATIVE

## 2019-10-07 ENCOUNTER — COMMUNICATION - HEALTHEAST (OUTPATIENT)
Dept: ADMINISTRATIVE | Facility: HOSPITAL | Age: 49
End: 2019-10-07

## 2019-10-07 ENCOUNTER — COMMUNICATION - HEALTHEAST (OUTPATIENT)
Dept: RADIATION ONCOLOGY | Facility: HOSPITAL | Age: 49
End: 2019-10-07

## 2019-10-08 ENCOUNTER — COMMUNICATION - HEALTHEAST (OUTPATIENT)
Dept: ONCOLOGY | Facility: HOSPITAL | Age: 49
End: 2019-10-08

## 2019-10-17 ENCOUNTER — AMBULATORY - HEALTHEAST (OUTPATIENT)
Dept: NURSING | Facility: CLINIC | Age: 49
End: 2019-10-17

## 2019-10-29 ENCOUNTER — AMBULATORY - HEALTHEAST (OUTPATIENT)
Dept: ONCOLOGY | Facility: HOSPITAL | Age: 49
End: 2019-10-29

## 2019-10-29 ENCOUNTER — OFFICE VISIT - HEALTHEAST (OUTPATIENT)
Dept: RADIATION ONCOLOGY | Facility: HOSPITAL | Age: 49
End: 2019-10-29

## 2019-10-29 ENCOUNTER — TRANSFERRED RECORDS (OUTPATIENT)
Dept: HEALTH INFORMATION MANAGEMENT | Facility: CLINIC | Age: 49
End: 2019-10-29

## 2019-10-29 DIAGNOSIS — Q28.2 AVM (ARTERIOVENOUS MALFORMATION) BRAIN: ICD-10-CM

## 2019-11-01 ENCOUNTER — COMMUNICATION - HEALTHEAST (OUTPATIENT)
Dept: ONCOLOGY | Facility: HOSPITAL | Age: 49
End: 2019-11-01

## 2019-11-08 ENCOUNTER — TELEPHONE (OUTPATIENT)
Dept: CONSULT | Facility: CLINIC | Age: 49
End: 2019-11-08

## 2019-12-19 ENCOUNTER — OFFICE VISIT (OUTPATIENT)
Dept: URGENT CARE | Facility: URGENT CARE | Age: 49
End: 2019-12-19
Payer: COMMERCIAL

## 2019-12-19 VITALS
WEIGHT: 172 LBS | SYSTOLIC BLOOD PRESSURE: 113 MMHG | BODY MASS INDEX: 26.07 KG/M2 | TEMPERATURE: 98.4 F | OXYGEN SATURATION: 98 % | DIASTOLIC BLOOD PRESSURE: 66 MMHG | HEART RATE: 64 BPM | HEIGHT: 68 IN

## 2019-12-19 DIAGNOSIS — R07.0 THROAT PAIN: Primary | ICD-10-CM

## 2019-12-19 DIAGNOSIS — J02.9 ACUTE PHARYNGITIS, UNSPECIFIED ETIOLOGY: ICD-10-CM

## 2019-12-19 LAB
DEPRECATED S PYO AG THROAT QL EIA: NORMAL
SPECIMEN SOURCE: NORMAL

## 2019-12-19 PROCEDURE — 99213 OFFICE O/P EST LOW 20 MIN: CPT | Performed by: PHYSICIAN ASSISTANT

## 2019-12-19 PROCEDURE — 87081 CULTURE SCREEN ONLY: CPT | Performed by: PHYSICIAN ASSISTANT

## 2019-12-19 PROCEDURE — 87880 STREP A ASSAY W/OPTIC: CPT | Performed by: PHYSICIAN ASSISTANT

## 2019-12-19 ASSESSMENT — MIFFLIN-ST. JEOR: SCORE: 1453.69

## 2019-12-20 NOTE — PROGRESS NOTES
"SUBJECTIVE:  Maura Elder is a 49 year old female with a chief complaint of sore throat.  Onset of symptoms was 4 day(s) ago.    Course of illness: gradual onset.  Severity moderate  Current and Associated symptoms: runny nose, stuffy nose, ear pain bilateral and fatigue  Treatment measures tried include Decongestants and Antihistamine.  Predisposing factors include None.    No past medical history on file.  Current Outpatient Medications   Medication Sig Dispense Refill     norgestimate-ethinyl estradiol (PREVIFEM) 0.25-35 MG-MCG tablet Take 1 tablet by mouth daily.       oseltamivir (TAMIFLU) 75 MG capsule Take 1 capsule by mouth 2 times daily. (Patient not taking: Reported on 12/19/2019) 10 capsule 0     Social History     Tobacco Use     Smoking status: Never Smoker     Smokeless tobacco: Never Used   Substance Use Topics     Alcohol use: Not on file       ROS:  CONSTITUTIONAL:NEGATIVE for fever, chills, change in weight  EYES: NEGATIVE for vision changes or irritation  ENT/MOUTH: nasal congestion and sore throat  RESP:NEGATIVE for significant cough or SOB    OBJECTIVE:   /66   Pulse 64   Temp 98.4  F (36.9  C) (Oral)   Ht 1.727 m (5' 8\")   Wt 78 kg (172 lb)   SpO2 98%   BMI 26.15 kg/m    GENERAL APPEARANCE: healthy, alert and no distress  EYES: EOMI,  PERRL, conjunctiva clear  HENT: ear canals and TM's normal.  Nose normal.  Pharynx erythematous with some exudate noted.  NECK: supple, non-tender to palpation, no adenopathy noted  RESP: lungs clear to auscultation - no rales, rhonchi or wheezes  CV: regular rates and rhythm, normal S1 S2, no murmur noted  SKIN: no suspicious lesions or rashes    Rapid Strep test is negative; await throat culture results.    ASSESSMENT:    1. Throat pain    - Strep, Rapid Screen    2. Acute pharyngitis, unspecified etiology      PLAN:   See orders in epic.   Symptomatic treat with gargles, lozenges, and OTC analgesic as needed. Follow-up with primary clinic if " not improving over the next week..

## 2019-12-21 LAB
BACTERIA SPEC CULT: NORMAL
SPECIMEN SOURCE: NORMAL

## 2020-01-22 ENCOUNTER — OFFICE VISIT (OUTPATIENT)
Dept: CONSULT | Facility: CLINIC | Age: 50
End: 2020-01-22
Attending: MEDICAL GENETICS
Payer: COMMERCIAL

## 2020-01-22 ENCOUNTER — OFFICE VISIT (OUTPATIENT)
Dept: CONSULT | Facility: CLINIC | Age: 50
End: 2020-01-22
Attending: GENETIC COUNSELOR, MS
Payer: COMMERCIAL

## 2020-01-22 VITALS
HEART RATE: 92 BPM | HEIGHT: 68 IN | SYSTOLIC BLOOD PRESSURE: 108 MMHG | WEIGHT: 168.43 LBS | RESPIRATION RATE: 16 BRPM | BODY MASS INDEX: 25.53 KG/M2 | DIASTOLIC BLOOD PRESSURE: 75 MMHG

## 2020-01-22 DIAGNOSIS — Q28.2 AVM (ARTERIOVENOUS MALFORMATION) BRAIN: Primary | ICD-10-CM

## 2020-01-22 PROCEDURE — 96040 ZZH GENETIC COUNSELING, EACH 30 MINUTES: CPT | Mod: ZF | Performed by: GENETIC COUNSELOR, MS

## 2020-01-22 PROCEDURE — G0463 HOSPITAL OUTPT CLINIC VISIT: HCPCS | Mod: ZF

## 2020-01-22 ASSESSMENT — MIFFLIN-ST. JEOR: SCORE: 1439.88

## 2020-01-22 ASSESSMENT — PAIN SCALES - GENERAL: PAINLEVEL: NO PAIN (0)

## 2020-01-22 NOTE — PROGRESS NOTES
Name: Maura Elder   : 1970  MRN: [id number]  Date of visit: 2020    Presenting Information:   Maura Elder is a 49 year old female referred to the [facility] due to history of let parietal lobe AVM. She was seen for a genetic counseling appointment in coordination with Dr. Dennis who completed an evaluation (see Dr. Dennis's clinic note for further documentation regarding this evaluation).  I met with the family at the request of Dr. Dennis  to obtain a personal and family history, discuss the possible genetic contributions to Maura's symptoms, and to obtain informed consent for genetic testing.    This case was co-counseled by Theodora Pulliam Genetic Counseling Intern.    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 The AVM was incidentally found during work up for her diagnosis of chronic lymphocytic leukemia (CLL). CLL is being monitored every 6 months. Maura has had contrast CT of her chest, abdomen, pelvis that did not reveal any additional AVMs. Maura reports a history of migraines with aura that began in middle school. These became less frequent as she aged until they were only occurring at during times of significant stress. She has not had a migraine for 12 years. On examination Dr. Dennis noted no obvious telangiectasias, or vascular malformations and bilaterally slightly prominent venous capillaries in the feet (Maura reports many individuals in her family have this).     She has no history of nosebleeds, strokes, limb length discrepancy, cutaneous capillary malformations, birthmarks, known vascular malformations anywhere else in the body, GI bleeding, joint hypermobility, organ rupture, arterial rupture, hernia, excessive bruising, severe myopia, chest pain, or palpitations.     Please see Dr. Dennis's note for further information regarding the  "patient's history and today's evaluations    Family History: A three generation pedigree was obtained and scanned into the electronic medical record. The relevant portions are described below:      Children- daughter, 8y, with nosebleeds in the past month. These nosebleeds occurred in the day time and lasted 1-5 minutes. Maura suspected they may be related to winter weather. She has no other health/development concerns.     Siblings- sister with no health concerns who had one stillbirth due to anencephaly.     Mother- 74y has history of pulmonary embolism and 2 hip replacements due to a birth defect.     Maternal Relatives- Grandfather with suspected pancreatic cancer diagnosed at 81y. Great grandfather with colon cancer, great aunt with \"female cancer\" another great aunt with brain cancer.     Father- with meniere's pericarditis and afib.    Paternal Relatives- Uncle with an AVm diagnosed in his 70s (unknown location), and a history of seizures (2 in last 20 years), mini strokes, afib s/p pacemaker, and MI. Another uncle  at 82 from lung cancer and had h/o multiple brain tumors that were surgically removed. Grandmother with possible ovarian cancer,  at 51y. Great uncle with colon cancer diagnosed at 80y    Family history is otherwise largely non-contributory. There were no other reports of birth defects, developmental delay, intellectual disability, recurrent pregnancy loss, stillbirth, cancer diagnosed under age 50y, early onset vision/hearing loss, or genetic testing. Maternal ancestry is [place] and paternal ancestry is [place]/Indian/[place]. Consanguinity was denied.     Genetic Counseling Discussion:  We discussed that Maura has an AVM and has a family history of AVM raising concern for an underlying genetic etiology.    Cerebral AVMs can be isolated or part of a genetic syndrome such as hereditary hemorrhagic telangiectasia (HHT) or capillary malformation-arteriovenous " malformation (CM-AVM) syndromes. HHT and CM-AVM are both autosomal dominant conditions. We reviewed features of these conditions and the significant inter- and intrafamilial variability.     We discussed basic genetic concepts including DNA, genes, and chromosomes.    We reviewed the availability of genetic testing via Next Generation Sequencing (NGS) for analysis of genes known to be associated with HHT and CM-AVM, with the aim of determining what condition is causing Jj AVM.     We went on to discuss the details, limitations, and possible outcomes of NGS. In particular, We discussed that there are three possible results from NGS:    Negative: meaning normal or no mutations are identified in the genes that were tested/sequenced    Positive: meaning a mutation that is known to be associated with a particular set of symptoms is identified    Variant of uncertain significance (VUS): meaning a change in the DNA sequence of a particular gene was seen but there is not enough information or data yet to know if it explains the symptoms. If a VUS is identified, testing of other relatives may be helpful to provide clarification.  In most cases, identification of a VUS does not confirm a diagnosis and does not result in any clinically actionable recommendations.    We discussed the potential benefits of genetic testing and why this genetic testing is medically indicated. A positive result will help determine the etiology of the AVM noted in Maura and may guide the medical management for her. For example, surveillance guidelines for individuals with CM-AVM may include but are not limited to imaging of the brain, spinal cord, heart and other areas where there are symptoms of a vascular malformation. Whereas HHT surveillance may include but is not limited to imaging of the brain, heart, liver, lungs, and screening for anemia.     Also, if a genetic cause is found for Jj AVM, it will give us a more accurate  risk assessment for other family members, particularly future children for her children, parents, sister, nieces and nephews.     Next Generation Sequencing is a well established technology utilized by all molecular genetic labs throughout the country for identifying disease-causing mutations in various genes.  Next Generation Sequencing is currently the standard of care for genetic testing of single genes.  The recommended testing for Maura  is DIAGNOSTIC testing, and it is NOT investigational.    We also reviewed limitations of NGS technology. NGS panels test a subset of our genes that are chosen based on an individual's symptoms. NGS does not test for all genetic conditions, for example NGS is unable to detect balanced chromosome rearrangements, triplet repeat disorders, or copy number variation.  Therefore, if the NGS panel is inconclusive we may consider additional testing options.     Merle wishes to pursue genetic testing today. We will discuss the genes we want to test for with Maura's cancer genetic counselor to attempt to group testing. If needed we will submit information for prior authorization and Maura will be contacted regarding the authorization status and potential cost of testing. If we proceed with testing, I will call them with the results.  An informed consent form was discussed in detail and signed by the patient.      It was a pleasure meeting Maura today. They were encouraged to reach out to me if they have any further questions.     Plan:  1. We will reachout to VIRIDIANA Mayberry to coordinate genetic testing. If needed we will initiate prior authorization  with our piror authorization team. They will call the patient with the determination once it is received.   2. Testing recommended is NGS of ACVRL1, ENG, GDF2, SMAD4, EPHB4, RASA1  3. Results will be returned by phone, and a follow-up appointment will be scheduled at that time.  4. Contact information was provided should  any questions arise in the future.      Toya Napoles MS, [facility]  Certified Genetic Counselor   [facility]  Phone: [phone]    Time spent in consultation face to face was approximately 30 minutes.    CC:No letter

## 2020-01-22 NOTE — NURSING NOTE
"Chief Complaint   Patient presents with     Consult     Genetics/AVM consult.     Vitals:    01/22/20 1255   BP: 108/75   BP Location: Left arm   Patient Position: Chair   Pulse: 92   Resp: 16   Weight: 168 lb 6.9 oz (76.4 kg)   Height: 5' 8.15\" (173.1 cm)   HC: 56.5 cm (22.24\")      Makeda Bolaños M.A.  January 22, 2020  "

## 2020-01-22 NOTE — PATIENT INSTRUCTIONS
Genetics  [facility]     Contact our nurse coordinator at [phone] or send a [employer] message for any non-urgent general or medical questions.     If you had genetic testing and have further questions, please contact the genetic counselor who saw you during your visit.    Toya Napoles  Ph: [phone]    To schedule appointments:  Pediatric Call Center for [facility]: [phone]  Neuropsychology [phone]  Radiology/ Imaging/Echocardiogram: [phone]   Services:   [phone]     Please consider signing up for [employer] for easy and confidential communication. Please sign up at the clinic  or go to [email or web address].

## 2020-01-22 NOTE — PROGRESS NOTES
GENETICS CLINIC CONSULTATION     Name:  Maura Elder  :   1970  MRN:   [id number]  Date of service: 2020  Primary Care Provider: Gale Rizo MD  Referring Provider: Maura Giordano MD    Dear Dr. Maura Giordano,      We had the pleasure of seeing your patient in Genetics Clinic today.     Reason for consultation:  A consultation in the [facility] was requested for Maura, a 49 year old female, for evaluation of cerebral AVM.      Maura was not accompanied to this visit by her family members.  She also saw our genetic counselor at this visit.       History is obtained from Patient and EMR    Assessment:    Maura Elder is a 49 year old female history of chronic lymphocytic leukemia with incidentally detected cerebral AVM.      Cerebral AVMs are seen both in sporadic cases as well as in genetic syndromes.     Cerebral AVMs occur most frequently as an isolated finding, but may be a manifestation of Hereditary hemorrhagic telangiectasia (HHT) or another dominantly inherited vascular dysplasia such as capillary malformation-arteriovenous malformation (CM-AVM) caused by pathogenic variants. Families have also been reported with autosomal dominant AVMs of the brain and no other features of HHT. However, the presence of multiple cerebral AVMs is highly suggestive of HHT. In the brain, AVMs are typically present at birth. Cerebral AVMs occur in approximately 10% of individuals with HHT. The absence of a family history of recurrent nosebleed and presence of telangiectases specifically on the lips, face, and hands best distinguish other vascular dysplasias from HHT.    Intracranial aneurysms can be associated with cerebral AVMs. The incidence of aneurysms in patients harboring an AVM is higher than the incidence of aneurysms in the general population. Maura has no history suggestive of a connective tissue disorder     We discussed testing for  genes associated with HHT and CV-AVM: ACVRL1, ENG, GDF2, SMAD4, EPHB4, RASA1. We will reach out to   Kelsey Hutchinson MS, [facility] (Licensed Genetic Counselor, [facility]) and coordinate testing.     Maura verbalized understanding and agreed with the plan above.    Plan:    1. Ordered at this visit:    Preauthorization for panel testing for arteriovenous malformation including the following genes: ACVRL1, ENG, GDF2, SMAD4, EPHB4, RASA1.   2. Genetic counseling consultation with Toya Napoles MS, [facility]  3. Follow up: Return in about 6 months (around 7/22/2020).     History of Present Illness:  Maura Elder is a 49 year old female history of chronic lymphocytic  leukemia with incidentally detected single cerebral AVM.  This is located in the left parietal lobe.  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     CLL was originally discovered through axillary adenopathy that was present on mammogram in April 2019.  Further work-up included CT soft tissue neck on 5/16/2019 with incidentally discovered 8 x9 millimeter cavernous ICA aneurysm.  Further evaluation with CTA showed an AVM in the left superior left frontal parietal area measuring approximately 2 x 1 cm.  Subsequent IR cerebral angiogram showed the AVM to measure 24.8x 10.1x 6.7 mm nidus in the left parietal lobe, no deep venous drainage.  Spetzler Alex Grading Scale, grade 1 AVM. Functional MRI was obtained 7/3/2019. She is being followed by neurosurgery. She underwent stereotactic radiosurgery on 9/24/2019.  This plan to repeat imaging end of March.    Maura has had contrast CT of her chest, abdomen, pelvis: did not reveal any AVMs.    No history of limb length discrepancy, cutaneous capillary malformations, birthmarks, known vascular malformations anywhere else in the body.  No history of recurrent, spontaneous nosebleeds.  No history of GI bleeding.  There is no history of headaches, vision changes  or stroke.  No history of joint hypermobility, organ rupture, arterial rupture, hernia, excessive bruising, severe myopia, chest pain, palpitations.    There is no history of tobacco use.    Regarding her CLL, she is following up closely with hematology/oncology.  No current treatments. She has been seen by cancer genetic counselor, Kelsey Hutchinson [facility].  There is plan to send genetic testing on skin biopsy.    Pertinent studies/abnormal test results:     Imaging results:   7/3/2019 MR head functional:   #1 Left parietal arteriovenous malformation with superficial nidus measuring up to 2.7 cm in AP dimension.  #2 arterial supply from left middle cerebral artery branches is better described on the previous conventional angiogram  #3 dominant venous drainage cephalad to the superior sagittal sinus with associated venous aneurysm measuring up to 1 cm  #4 DTI demonstrates no significant white matter tract involvement by the superficial arteriovenous malformation  Functional MRI  #1 left hemisphere dominance for both expressive and receptive language, vernix activation along the posterior perisylvian region is approximately 1.5 to 2 cm retinitis.  #2 expected somatic tropic distribution of motor activation for finger and lip/tongue paradigms.  Contralateral activation of foot paradigm.  The activation of finger tapping is located just anterior to the dominant draining vein and venous aneurysm.    6/21/2019, cervical angiogram  #1: AVM with a diffuse 24.8x10.1x6.7 mm nidus.  The anterior compartment of the nidus is more compact and measures 5.2x 10.1x 6.7 mm.  Arterial supply arises primarily from 3 hypertrophied right MCA branches, with the more anterior 2 branches supplying the compact component of the nidus.  All branches contribute innumerable pedicles with a combination of direct and and en passage supply.  No evidence of perinidal or definite intranidal aneurysm.  There are 2 primary venous outlets.  The dominant  venous outlet arises from the superior aspect of the compact portion of the nidus.  There is rapid shunting into this markedly ectatic vein, which drains into the superior sagittal sinus.  A venous aneurysm arises from this vein immediately as it exits the nidus measuring 9.5 x 8.2 x 10.4 mm.  An additional minor draining vein arises from the more diffuse posterior aspect of the nidus, converging with a larger vein before entering the superior sagittal sinus.  No evidence of venous outflow stenosis.  No deep venous drainage.    #2: 11.6 x 9.4 x 10.3 mm inferiorly projecting saccular extradural aneurysm of the left internal carotid artery cavernous segment with a relatively narrow 4.5 mm neck.    #3:  no evidence of additional intracranial aneurysm.   .     Pregnancy/ History:  Maura was born at term gestation via vaginal delivery.     Past Medical History:  Migraine- front of head- during puberty. Not since the past 12 years.   Myopia: -2.25 bilaterally    Past Surgical History:  No past surgical history on file.    Medications:  Current Outpatient Medications   Medication Sig Dispense Refill     norgestimate-ethinyl estradiol (PREVIFEM) 0.25-35 MG-MCG tablet Take 1 tablet by mouth daily.       oseltamivir (TAMIFLU) 75 MG capsule Take 1 capsule by mouth 2 times daily. (Patient not taking: Reported on 2019) 10 capsule 0   Green tea extract  Probiotic  Calcium    Allergies:  Allergies   Allergen Reactions     Penicillins      Immunization:  Up-to-date per patient    Diet:  Regular    Care team:  Patient Care Team       Relationship Specialty Notifications Start End    Gale Rizo PCP - General Family Practice  20     Phone: [phone] Fax: [phone]         [address] [address]      Neurosurgery  MD Maura Faustin MD (Radiation Oncology, [facility])  Meghann Waters MD (Hematology and Oncology, [facility]  Kelsey Hutchinson MS, [facility] (Licensed Genetic Counselor, [facility])    Developmental/Educational History:  No previous history of developmental delay.     Review of Systems:  GENERAL:  Denies: Fatigue, Fever, Recent weight change  EYES: Stye. Myopia Denies:  blindness, Hx of eye surgery  EARS: Denies: Hearing impairment or deafness, Frequent ear infections, Tinnitus, Vertigo  NOSE/MOUTH/THROAT: Sore throat.  Denies: Epistaxis, Anosmia, Dental problems, Hx of cleft lip or palate, Difficulty swallowing  RESPIRATORY: Cough.  Denies: Asthma, Dyspnea Frequent pneumonias  CARDIOVASCULAR: Denies: Murmur, High or Low BP, Syncope, Cyanosis, Edema  HEMATOLOGY/LYMPHATIC: chronic lymphocytic leukemia   GASTROINTESTINAL: Denies: Nausea or vomiting, Bloody stool, Jaundice, Heartburn or Indigestion   MUSCULOSKELETAL: Denies: Joint laxity, Joint pain or stiffness, Limb abnormalities   RENAL/URINARY: Denies: Hx of kidney stones, Dysuria, Hematuria, Incontinence, Frequent UTI's   NEUROLOGICAL: Denies: Hx of seizures, Headaches, Gait abnormalities, Numbness or tingling, Memory loss   ENDOCRINE: Denies: Heat or Cold intolerance, Polydipsia, Polyphagia, Thyroid problems, Diabetes, Hirsutism   INTEGUMENT: Acne.  Denies: Birthmarks, Rashes, Hypo - or Hyperpigmented macules, Excessively \"stretchy\" skin, Keloids, Poor wound healing    Family History:    A detailed pedigree was obtained by the genetic counselor at the time of this appointment and is scanned into the electronic medical record. I personally reviewed and discussed the pedigree with the GC and the family and concur with the GC note. Please refer to the formal pedigree for full details.     Daughter- has h/o day time nose bleeds x2. 1st episide: lasted for about 5 min. 2nd episode lasted a couple of minutes. Scant bleeding. Maura believes this is because of weather.     Social History:  Lives with , daughter.     Physical Examination:  Blood " "pressure 108/75, pulse 92, resp. rate 16, height 5' 8.15\" (173.1 cm), weight 168 lb 6.9 oz (76.4 kg), head circumference 56.5 cm (22.24\"), not currently breastfeeding.    Pictures taken during the visit: no    General: WDWN in NAD, appears stated age, non-dysmorphic  Head and Face: NCAT  Ears: Well-formed, normal in position and placement, canals patent  Eyes: Normal in position and placement, EOMI; lids, lashes, and brows unremarkable  Nose: Nares patent  Mouth/Throat: Lips, philtrum, palate, dentition unremarkable  Neck: No pits, tags, fissures  Chest: Symmetric, Raza stage 5  Respiratory: Clear to auscultation bilaterally  Cardiovascular: Regular rate and rhythm with no murmur  Abdomen: Nondistended, soft, nontender, no hepatosplenomegaly  Genitourinary: deferred  Extremities/Musculoskeletal: Symmetrical; full ROM; hands, feet, nails, palmar and plantar creases unremarkable.  Negative thumb and wrist sign.  No obvious limb length discrepancy.  No obvious spine deformities.  Neurologic: Mental status appropriate for age; good tone, strength, and muscle bulk  Skin: No obvious telangiectasias, or vascular malformations.  Bilaterally slightly prominent venous capillaries in the feet (Maura reports many individuals in her family have this)         Thank you for allowing us to participate in the care of Maura Elder. Please do not hesitate to contact us with questions.            Serina Dennis MD    Division of Genetics and Metabolism  Department of Pediatrics        Route to  Gale Rizo, Referred Self    "

## 2020-02-07 ENCOUNTER — OFFICE VISIT - HEALTHEAST (OUTPATIENT)
Dept: FAMILY MEDICINE | Facility: CLINIC | Age: 50
End: 2020-02-07

## 2020-02-07 DIAGNOSIS — Z00.00 ROUTINE GENERAL MEDICAL EXAMINATION AT A HEALTH CARE FACILITY: ICD-10-CM

## 2020-02-07 DIAGNOSIS — J01.90 ACUTE SINUSITIS WITH SYMPTOMS > 10 DAYS: ICD-10-CM

## 2020-02-07 DIAGNOSIS — R87.618 BENIGN-APPEARING ENDOMETRIAL CELLS ON CERVICAL PAP SMEAR: ICD-10-CM

## 2020-02-07 ASSESSMENT — MIFFLIN-ST. JEOR: SCORE: 1444.83

## 2020-02-10 ENCOUNTER — COMMUNICATION - HEALTHEAST (OUTPATIENT)
Dept: FAMILY MEDICINE | Facility: CLINIC | Age: 50
End: 2020-02-10

## 2020-02-10 DIAGNOSIS — Z13.1 SCREENING FOR DIABETES MELLITUS: ICD-10-CM

## 2020-02-10 DIAGNOSIS — E78.5 DYSLIPIDEMIA: ICD-10-CM

## 2020-02-10 LAB
HPV SOURCE: NORMAL
HUMAN PAPILLOMA VIRUS 16 DNA: NEGATIVE
HUMAN PAPILLOMA VIRUS 18 DNA: NEGATIVE
HUMAN PAPILLOMA VIRUS FINAL DIAGNOSIS: NORMAL
HUMAN PAPILLOMA VIRUS OTHER HR: NEGATIVE
SPECIMEN DESCRIPTION: NORMAL

## 2020-02-17 ENCOUNTER — COMMUNICATION - HEALTHEAST (OUTPATIENT)
Dept: FAMILY MEDICINE | Facility: CLINIC | Age: 50
End: 2020-02-17

## 2020-02-18 LAB
BKR LAB AP ABNORMAL BLEEDING: NO
BKR LAB AP BIRTH CONTROL/HORMONES: NORMAL
BKR LAB AP CERVICAL APPEARANCE: NORMAL
BKR LAB AP GYN ADEQUACY: NORMAL
BKR LAB AP GYN INTERPRETATION: NORMAL
BKR LAB AP GYN OTHER FINDINGS: NORMAL
BKR LAB AP HPV REFLEX: NORMAL
BKR LAB AP LMP: NORMAL
BKR LAB AP PATIENT STATUS: NORMAL
BKR LAB AP PREVIOUS ABNORMAL: 2016
BKR LAB AP PREVIOUS NORMAL: 2017
HIGH RISK?: YES
PATH REPORT.COMMENTS IMP SPEC: NORMAL
RESULT FLAG (HE HISTORICAL CONVERSION): NORMAL

## 2020-02-20 ENCOUNTER — COMMUNICATION - HEALTHEAST (OUTPATIENT)
Dept: FAMILY MEDICINE | Facility: CLINIC | Age: 50
End: 2020-02-20

## 2020-03-30 ENCOUNTER — COMMUNICATION - HEALTHEAST (OUTPATIENT)
Dept: ADMINISTRATIVE | Facility: HOSPITAL | Age: 50
End: 2020-03-30

## 2020-04-17 ENCOUNTER — COMMUNICATION - HEALTHEAST (OUTPATIENT)
Dept: ADMINISTRATIVE | Facility: HOSPITAL | Age: 50
End: 2020-04-17

## 2020-07-06 DIAGNOSIS — Q28.2 AVM (ARTERIOVENOUS MALFORMATION) BRAIN: Primary | ICD-10-CM

## 2020-07-23 ENCOUNTER — TELEPHONE (OUTPATIENT)
Dept: CONSULT | Facility: CLINIC | Age: 50
End: 2020-07-23

## 2020-07-23 NOTE — TELEPHONE ENCOUNTER
I called 7/23/2020 to update Maura on insurance coverage for her genetic testing (no PA was required). However, I was unable to reach Maura.  I left a non-detailed voicemail with my name and phone number.    CHANDA Cool  Genomics Billing    [facility]   Molecular Diagnostics Laboratory  [address] [place] [id number]  [phone]

## 2020-07-30 ENCOUNTER — HOSPITAL ENCOUNTER (OUTPATIENT)
Dept: MRI IMAGING | Facility: CLINIC | Age: 50
Setting detail: RADIATION/ONCOLOGY SERIES
Discharge: STILL ADMITTED OR ADMINISTRATIVE | End: 2020-07-30
Attending: RADIOLOGY

## 2020-07-30 DIAGNOSIS — Q28.2 AVM (ARTERIOVENOUS MALFORMATION) BRAIN: ICD-10-CM

## 2020-08-04 NOTE — TELEPHONE ENCOUNTER
I called 8/3/2020 to notify Maura that no prior authorization is required for her genetic testing. Maura said she received a letter from her insurance stating testing was approved. I asked her to provide a copy of the letter she received and I will check benefits once I confirm testing was approved. Maura had no further questions.    CHANDA Cool  Genomics Billing    [facility]   Molecular Diagnostic Lab  [address] [place] [id number]  [phone]

## 2020-08-10 ENCOUNTER — COMMUNICATION - HEALTHEAST (OUTPATIENT)
Dept: ADMINISTRATIVE | Facility: HOSPITAL | Age: 50
End: 2020-08-10

## 2020-08-10 NOTE — TELEPHONE ENCOUNTER
"Maura sent an e-mail on 8/7 stating she does not want to proceed with testing at this time.     \"Adrien Avalos,    Thank you for your phone call the other day regarding genetic testing. [facility] and confirmed that they are willing to pay for the genetic testing; however I ve decided that at this time I don t want to pursue genetic testing. Thank you so much for your assistance and help with my case.    Many thanks,    - Maura Elder\"  "

## 2020-08-11 ENCOUNTER — OFFICE VISIT - HEALTHEAST (OUTPATIENT)
Dept: RADIATION ONCOLOGY | Facility: HOSPITAL | Age: 50
End: 2020-08-11

## 2020-08-11 DIAGNOSIS — Q28.2 AVM (ARTERIOVENOUS MALFORMATION) BRAIN: ICD-10-CM

## 2021-02-11 ENCOUNTER — HOSPITAL ENCOUNTER (OUTPATIENT)
Dept: MRI IMAGING | Facility: CLINIC | Age: 51
Discharge: HOME | End: 2021-02-11
Attending: RADIOLOGY

## 2021-02-11 DIAGNOSIS — Q28.2 AVM (ARTERIOVENOUS MALFORMATION) BRAIN: ICD-10-CM

## 2021-02-16 ENCOUNTER — OFFICE VISIT - HEALTHEAST (OUTPATIENT)
Dept: RADIATION ONCOLOGY | Facility: HOSPITAL | Age: 51
End: 2021-02-16

## 2021-02-16 DIAGNOSIS — Q28.2 AVM (ARTERIOVENOUS MALFORMATION) BRAIN: ICD-10-CM

## 2021-03-19 ENCOUNTER — AMBULATORY - HEALTHEAST (OUTPATIENT)
Dept: NURSING | Facility: CLINIC | Age: 51
End: 2021-03-19

## 2021-04-09 ENCOUNTER — AMBULATORY - HEALTHEAST (OUTPATIENT)
Dept: NURSING | Facility: CLINIC | Age: 51
End: 2021-04-09

## 2021-05-28 NOTE — PROGRESS NOTES
[facility] Hematology and Oncology Progress Note    Patient: Maura Elder  MRN: [id number]  Date of Service: 05/20/2019        Reason for Visit    Chief Complaint   Patient presents with     HE Cancer       Assessment and Plan    CLL, Martinez stage 2/ Binet B  Peripheral lymphocytosis and lymphadenopathy   Hypogammaglobulinemia  Fish studies show deletion 13 q. in 47.5% of interface nuclei  Beta-2 microglobulin of 2.4    Lab results are reviewed and confirm the diagnosis of CLL with FISH studies as above.  CT scans do show evidence of cervical, supraclavicular, axillary and retroperitoneal adenopathy and also left inguinal adenopathy with borderline splenomegaly.    Discussed with patient and  that this appears to be consistent with Martinez stage II disease.    Discussed natural history of CLL.  Discussed potential risk for transformation into more aggressive lymphoma.  Discussed symptoms of progression including more adenopathy and eventually anemia and thrombocytopenia.    Discussed indications for treatment.  Discussed possibility of autoimmune hemolytic anemia or autoimmune thrombocytopenia.    Discussed symptoms that she needs to watch for and call us about including combination of fevers, night sweats and unexplained weight loss, new significant fatigue, painful enlarging adenopathy and also new bruising or bleeding.    Discussed use of EGCG to help reduce rate of progression of disease.  Discussed that we do not have any clinical trials at this time for treatment.    Discussed that there is no indication for immediate treatment at this time and that we will plan to observe patient about every 3 months with lab work.    She had questions about prognosis and based on her current staging median survival is between 6 to 9 years with a range of up to 20 years and this may not take into consideration the impact of new oral therapies.    40 minutes spent majority in counseling and coordination of  care.    Plan: Continue observation for now  Follow-up in 6 weeks with repeat CBC    Measurable disease: CBC, flow cytometry, CT scans, quantitative immunoglobulins; FISH studies      ECOG Performance   ECOG Performance Status: 0    Distress Assessment  Distress Assessment Score: Extreme distress(recent scan)    Pain  Pain Score (Initial OR Reassessment): 1        Problem List    1. CLL (chronic lymphocytic leukemia) (H)  HM1(CBC and Differential)        CC: Gale Rizo MD    ______________________________________________________________________________    History of Present Illness    Ms. Maura Elder returns for follow-up.  She has undergone CT scans and lab work and is here to review results.  Still having some tenderness in the neck.  Also some fatigue related to her periods.  ECOG status remains 0.    Pain Status  Currently in Pain: Yes    Review of Systems    Constitutional  Constitutional (WDL): All constitutional elements are within defined limits  Neurosensory  Neurosensory (WDL): All neurosensory elements are within defined limits  Cardiovascular  Cardiovascular (WDL): All cardiovascular elements are within defined limits  Pulmonary  Respiratory (WDL): Within Defined Limits  Gastrointestinal  Gastrointestinal (WDL): All gastrointestinal elements are within defined limits  Genitourinary  Genitourinary (WDL): All genitourinary elements are within defined limits  Integumentary  Integumentary (WDL): All integumentary elements are within defined limits  Patient Coping  Patient Coping: Accepting  Distress Assessment  Distress Assessment Score: Extreme distress(recent scan)  Accompanied by  Accompanied by: Family Member    Past History  Past Medical History:   Diagnosis Date     LOIS I (cervical intraepithelial neoplasia I)      HPV (human papilloma virus) infection      Shingles          Past Surgical History:   Procedure Laterality Date     APPENDECTOMY  2012      SECTION  2011      NO PAST SURGERIES         Physical Exam    Recent Vitals 5/20/2019   Height -   Weight 173 lbs   BSA (m2) 1.94 m2   /73   Pulse 95   Temp 98.5   Temp src 1   SpO2 100   Some recent data might be hidden       GENERAL: Alert and oriented to time place and person. Seated comfortably. In no distress.    HEAD: Atraumatic and normocephalic.    EYES: CASEY, EOMI.  No pallor.  No icterus.    Oral cavity: no mucosal lesion or tonsillar enlargement.    NECK: supple. JVP normal.  No thyroid enlargement.    LYMPH NODES: No palpable, cervical, axillary or inguinal lymphadenopathy.    CHEST: clear to auscultation bilaterally.  Resonant to percussion throughout bilaterally.  Symmetrical breath movements bilaterally.    CVS: S1 and S2 are heard. Regular rate and rhythm.  No murmur or gallop or rub heard.  No peripheral edema.    ABDOMEN: Soft. Not tender. Not distended.  No palpable hepatomegaly or splenomegaly.  No other mass palpable.  Bowel sounds heard.    EXTREMITIES: Warm.    SKIN: no rash, or bruising or purpura.  Has a full head of hair.      Lab Results    No results found for this or any previous visit (from the past 168 hour(s)).    Imaging    Xr Chest 2 Views    Result Date: 4/26/2019  EXAM: XR CHEST 2 VIEWS LOCATION: [facility] DATE/TIME: 4/26/2019 4:30 PM INDICATION: Cough COMPARISON: None. FINDINGS: Negative chest.    Ct Soft Tissue Neck With Contrast    Result Date: 5/17/2019  EXAM: CT SOFT TISSUE NECK W CONTRAST LOCATION: [facility] DATE/TIME: 5/16/2019 12:18 PM INDICATION: CLL COMPARISON: None. CONTRAST: 100 mL Omnipaque 350 TECHNIQUE: Routine with IV contrast. Multiplanar reformats. Dose reduction techniques were used. FINDINGS: VISUALIZED INTRACRANIAL/ORBITS/SINUSES: 8 x 9 mm cavernous left ICA aneurysm. Visualized paranasal sinuses and mastoid air cells are clear. SUPRAHYOID NECK: Normal nasopharynx and oropharynx. Normal parapharyngeal and  spaces. Normal oral cavity and  floor of mouth structures. INFRAHYOID NECK: Normal supraglottic larynx, vocal cords, and hypopharynx. SALIVARY GLANDS: Multiple enlarged intraparotid lymph nodes bilaterally. Submandibular glands are normal. LYMPH NODES: Diffuse enlargement of anterior and posterior chain cervical lymph nodes, supraclavicular lymph nodes, and visualized axillary/retropectoral lymph nodes. For example, the dominant right level 2A lymph node measures 1.4 x 2.1 cm and a dominant right level 1B lymph node measures 1.3 x 2.5 cm. CAROTID SHEATH: Vascular structures of the neck are patent. THYROID: Normal. OTHER: Multilevel cervical spondylosis including moderate interbody degeneration at C5-C6 and C6-C7. The included lung apices are clear.     CONCLUSION: 1.  Diffuse enlargement of cervical lymph nodes consistent with the given history of CLL. 2.  8 x 9 mm cavernous left ICA aneurysm. Recommend dedicated head MRA or CTA for more detailed evaluation. NOTE: ABNORMAL REPORT THE DICTATION ABOVE DESCRIBES AN ABNORMALITY FOR WHICH FOLLOW-UP IS NEEDED.     Mammo Screening Bilateral    Result Date: 4/24/2019  [facility] BILATERAL SCREENING MAMMOGRAM 4/24/2019 4:06 PM INDICATION: Breast cancer screening. COMPARISON: 01/20/2017, 09/04/2014 and 06/09/2010. TECHNIQUE: Bilateral screening full-field digital mammography with computer-aided detection. FINDINGS: There are scattered areas of fibroglandular density. Both breasts are negative. However, the partially visualized right and left axillary lymph nodes appear larger than at prior mammograms. Recommend bilateral axillary ultrasound. Our staff will contact the patient for additional imaging. ACR BI-RADS Category 0: Incomplete: Need Additional Imaging Evaluation and/or Prior Mammograms for Comparison.    Ct Chest Abdomen Pelvis Without Oral With Iv Contrast    Result Date: 5/16/2019  EXAM: CT CHEST ABDOMEN PELVIS WO ORAL W IV CONTRAST LOCATION: [facility] DATE/TIME: 5/16/2019  12:21 PM INDICATION: CLL. Stage I. COMPARISON: CT abdomen and pelvis 12/19/2012 TECHNIQUE: Helical thin-section CT scan of the chest, abdomen, and pelvis was performed following injection of IV contrast. Multiplanar reformats were obtained. Dose reduction techniques were used. CONTRAST: Iohexol (Omni) 100 mL FINDINGS: CHEST: Lungs are clear. No pleural effusions. Mild bilateral axillary lymphadenopathy. Largest right axillary node measures 2.5 x 1.5 cm, image 78. Largest left axillary node measures 2.5 x 1.8 cm, image 76. Minimally prominent mediastinal nodes. Subcarinal node measures 1.3 cm.  ABDOMEN: Mild splenomegaly measuring 12 cm. Normal liver, kidneys, pancreas, and adrenal glands. Mild retroperitoneal lymphadenopathy. Largest conglomeration of left periaortic nodes measure 3.1 x 2.2 cm on image 310. PELVIS: Mild iliac and inguinal lymphadenopathy. Largest left external iliac node on image 448 measures 3.1 x 1.6 cm. No adnexal masses. MUSCULOSKELETAL: Negative.     CONCLUSION: 1.  Mild lymphadenopathy in the axilla, retroperitoneum, and pelvis. Minimally prominent nodes in the mediastinum. 2.  Mild splenomegaly. 3.  No other significant findings.    Us Breast Limited (focal) Bilateral    Result Date: 4/26/2019  UNILATERAL US BREAST LIMITED (FOCAL) BILATERAL 4/26/2019 3:41 PM INDICATION: Enlarged bilateral axillary lymph nodes-call back. COMPARISON: Mammograms from 4/24/2019, 1/20/2017, 9/4/2014, and 6/9/2010. ULTRASOUND FINDINGS: Bilateral axillary ultrasound was performed by both the ultrasonographer and the radiologist. Enlarged lymph nodes with thickened cortices are seen in the axilla bilaterally. One on the right measures 2.2 x 1.0 x 2.6 cm. One on the left measures 2.2 x 2.4 cm. Another measures 3.2 x 1.3 x 3 cm. Several similar-sized lymph nodes are present in the axilla bilaterally.     Enlarged lymph nodes with thickened cortices in the axilla bilaterally. There is a broad differential diagnosis  including autoimmune disease, lymphoma, leukemia, and severe chronic infection. No abnormality is seen in the breast to suggest that this would be bilateral metastatic breast cancer. Further evaluation with physical exam, laboratory evaluation, and possible CTs as well as biopsy-confirmed benign cause is recommended. The findings and recommendations were discussed with patient and Dr. Hopson at the time of exam. Dr. Hopson then had an opportunity to speak to the patient prior to discharge from the Breast Center. ACR BI-RADS Category 4: Suspicious. Results given to the patient.        Signed by: Meghann Waters MD

## 2021-05-28 NOTE — TELEPHONE ENCOUNTER
Telephoned and spoke with Maura to assist in scheduling oncology referral as ordered by Dr. Hopson.  Patient has recent atypical lymphocytosis, and lymphadenopathy.  Writer discussed with Dr. Artis who placed orders for LDH & CMP to be drawn at primary clinic.  Patient indicates she will visit [facility] tomorrow and she will call ahead to make sure labs can be drawn at that time.     Consult appointment created with Dr. Waters on Tuesday, 5/7/19 at 11:30am (11:00am arrival).  Welcome letter, health history form, and medications/allergies list will be e-mailed to patient today. Blanca Lizama RN

## 2021-05-28 NOTE — PROGRESS NOTES
"Patient is a 48-year-old female presents today for evaluation of her axillary lymphadenopathy.    She was seen on February 6 for routine physical.  At that time she had a slightly enlarged lymph node under the mandible and therefore CBC was checked and was relatively normal.  The presumption was that this was something viral as patient was asymptomatic.  On March 8, she developed what she described as the \"worst cold.\"  She notes this was on a Friday, and her  and daughter both got the viral illness a day later.  While her  and daughter both improved, she did not.  In the interim, they went to [place], but the cough kept lingering.  Patient notes the cough is been nonproductive.  She notes that it feels a little heavy in the chest.  In the interim, she also traveled by plane to [place] 3 times.  She denies any fevers or chills.  No bone aches, no weight loss, no rashes.  She notes that her right jaw clicks a little, but is not bothersome.    Patient had a screening mammogram on 4/24/2019 which showed diffuse axillary lymphadenopathy and therefore patient had a follow-up ultrasound that confirmed this finding.    UNILATERAL US BREAST LIMITED (FOCAL) BILATERAL   4/26/2019 3:41 PM     INDICATION: Enlarged bilateral axillary lymph nodes-call back.  COMPARISON: Mammograms from 4/24/2019, 1/20/2017, 9/4/2014, and 6/9/2010.     ULTRASOUND FINDINGS: Bilateral axillary ultrasound was performed by both the ultrasonographer and the radiologist. Enlarged lymph nodes with thickened cortices are seen in the axilla bilaterally. One on the right measures 2.2 x 1.0 x 2.6 cm. One on the   left measures 2.2 x 2.4 cm. Another measures 3.2 x 1.3 x 3 cm. Several similar-sized lymph nodes are present in the axilla bilaterally.     IMPRESSION:   Enlarged lymph nodes with thickened cortices in the axilla bilaterally. There is a broad differential diagnosis including autoimmune disease, lymphoma, leukemia, and severe " chronic infection. No abnormality is seen in the breast to suggest that this   would be bilateral metastatic breast cancer. Further evaluation with physical exam, laboratory evaluation, and possible CTs as well as biopsy-confirmed benign cause is recommended. The findings and recommendations were discussed with patient and Dr. Hopson at the time of exam. Dr. Hopson then had an opportunity to speak to the patient prior to discharge from the Breast Center.     ACR BI-RADS Category 4: Suspicious.    Results were called to Dr. Hopson who then ordered an x-ray as well as some preliminary blood work-up.  Patient is here to review those results, as well as have her first office appointment to discuss her ultrasound findings.  She does have an upcoming appointment with heme oncology on the 5/7/2019.      ROS:  Const: No fevers/chills/nightsweats  HEENT: +cough/congestion.  RESP: no shortness of breath  CV: No chest pain or palpitations.      Patient Active Problem List    Diagnosis Date Noted     Dyslipidemia 02/06/2019     Tinnitus      Bloating (Symptom)      Cyst On The Right Ovary      Nontoxic Autonomous Thyroid Nodule        Current Outpatient Medications:      calcium carbonate (OS-CAROLINE) 600 mg (1,500 mg) tablet, Take by mouth daily. With Vitamin D   , Disp: , Rfl:      L.acidophilus-Bif. animalis (PROBIOTIC) 5 billion cell CpSP, Take 1 capsule by mouth daily., Disp: , Rfl:      multivitamin with minerals (THERA-M) 9 mg iron-400 mcg Tab tablet, Take 1 tablet by mouth daily., Disp: , Rfl:      psyllium (METAMUCIL) 3.4 gram packet, Take 1 packet by mouth daily., Disp: , Rfl:       Objective     /62 (Patient Site: Left Arm, Patient Position: Sitting, Cuff Size: Adult Regular)   Pulse 84   Resp 12   Wt 174 lb (78.9 kg)   LMP 04/23/2019   BMI 26.07 kg/m    General appearance: alert, appears stated age and cooperative  Ears: normal TM's and external ear canals both ears  Nose: Right nares shows erythema with  thick mucoid discharge.  Throat: lips, mucosa, and tongue normal; teeth and gums normal, no tonsillar adenopathy.  Clear postnasal drip.  Neck: mild bilateral cervical lymphadenopathy.  Lungs: clear to auscultation bilaterally  Breasts: normal appearance, no masses or tenderness, No axillary or supraclavicular adenopathy  Heart: regular rate and rhythm, S1, S2 normal, no murmur, click, rub or gallop  Extremities: no edema     Lab Results   Component Value Date    WBC 16.3 (H) 04/26/2019    HGB 12.3 04/26/2019    HCT 37.0 04/26/2019    MCV 90 04/26/2019     04/26/2019     EXAM: XR CHEST 2 VIEWS  LOCATION: [facility]  DATE/TIME: 4/26/2019 4:30 PM     INDICATION: Cough  COMPARISON: None.     FINDINGS: Negative chest.    Maura was seen today for follow-up.    Diagnoses and all orders for this visit:    Acute sinusitis with symptoms > 10 days  -     doxycycline (VIBRA-TABS) 100 MG tablet; Take 1 tablet (100 mg total) by mouth 2 (two) times a day for 10 days.  Discussed that with her lingering cough that has been present and started with a viral illness for 4 weeks, I do recommend treatment of this as an acute sinusitis.  We did discuss instructions for use the medication as well as potential side effects.  Discussed that it is unlikely the cause of her elevated white blood cell count.  Grateful for the fact that she does have an upcoming hematology oncology appointment on the seventh.    Axillary lymphadenitis  -     Lyme Antibody Cascade  -     QTF-Mycobacterium tuberculosis by QuantiFERON-TB Gold Plus  Will round out the work-up for axillary lymphadenitis with a Lyme antibody cascade as well as a TB Q.    Atypical lymphocytosis  -     Flow cytometry  -     Lactate Dehydrogenase (LDH)  -     Comprehensive Metabolic Panel  Patient has labs that were requested by hematology for her upcoming appointment we will draw those today.

## 2021-05-28 NOTE — CONSULTS
[facility] Hematology and Oncology Consult Note    Patient: Maura Elder  MRN: [id number]  Date of Service: 05/07/2019        Reason for Visit    I was consulted by Dr. Hopson regarding lymphocytosis and lymphadenopathy    Assessment/Plan    CLL, Martinez stage I  Peripheral lymphocytosis and lymphadenopathy  Hypogammaglobulinemia    Patient presenting with findings of adenopathy in the neck since February.  She had a bad cold in March and has had a lingering cough.  Had recent mammogram showing bilateral axillary adenopathy.  Does have palpable adenopathy bilaterally in the neck and also in the left inguinal area.  CBC shows elevated WBC count with predominant lymphocytosis.  Flow cytometry does show clonal population of cells expressing CD5 and CD23.  Explained to patient and  that all of this appears most consistent with chronic lymphocytic leukemia.    Explained that this is a bone marrow malignancy characterized by increasing numbers of mature lymphocytes resulting in elevated WBC count, lymphadenopathy and ultimately splenomegaly and decrease in hemoglobin and platelet count.  Recommend additional blood work to confirm the diagnosis and evaluate prognosis and also CT scans to evaluate the extent of adenopathy.  We will see patient back after these tests to review results and make further recommendations.    Discussed briefly the natural history of CLL.  Explained that about one third of patients but never required treatment.  Others will eventually progress and require treatment.  Explained that there are multiple treatment options including standard chemotherapy but also there are newer treatments available including oral agents.  Discussed that there is no urgency or emergency or initiation of treatment.    Patient and 's questions answered.  She was provided with information about CLL today.    Plan: Check labs today including FISH for CLL, beta-2 microglobulin, serum immunofixation and  serum protein electrophoresis  We will schedule CT of the neck, chest abdomen and pelvis  Follow-up in 2 weeks to review results          ECOG Performance   ECOG Performance Status: 0  Distress Assessment  Distress Assessment Score: No distress        Problem List    1. CLL (chronic lymphocytic leukemia) (H)  Beta-2-Microglobulin (Beta-2-M)    Immunofixation Electrophoresis, Serum    Electrophoresis, Protein, Serum    CTA Neck    CT Chest Abdomen Pelvis Without Oral With IV Contrast    fish for cll/mantle cell - Misc. Lab Test   2. Atypical lymphocytosis     3. Lymphadenopathy             CC: Gale Rizo MD      ______________________________________________________________________________      Staging History    Cancer Staging  No matching staging information was found for the patient.    History    Ms. Maura Elder is a very pleasant 48-year-old with no significant past history who was referred for lymphocytosis and lymphadenopathy.  She did see primary physician and there was some left cervical lymph nodes noted back in February.  She had a bad cold in March followed by a lingering cough.  She had recent mammogram which showed bilateral axillary adenopathy confirmed by ultrasound.  She had blood work including flow cytometry and then referred to us for further evaluation.    She is currently on an antibiotic course for her cough.  Chest x-ray was clear.  No fever chills or night sweats.  No change with appetite or weight.  Has not required antibiotics for several years.  Describes some nasal drainage.  No other mouth, throat or ear discomfort.  No shortness of breath.  No PND orthopnea leg swelling.  No abdominal pain or early satiety.  No change with bowels or urine.  No bleeding or rash.  ECOG status is 0.    Past history of HPV and shingles.  She has had  and appendectomy.  No other hospitalizations.    She is  lives in her home and has 1 daughter.  Works as a  and   at an insurance company.    No previous personal history of cancer.  Pancreatic cancer in 1 of her grandparents.  No cancer history in her parents or sister.    She is a never smoker.  Drinks occasionally.  Past History  Past Medical History:   Diagnosis Date     LOIS I (cervical intraepithelial neoplasia I)      HPV (human papilloma virus) infection      Shingles      Past Surgical History:   Procedure Laterality Date     APPENDECTOMY  2012      SECTION  2011     NO PAST SURGERIES       Family History   Problem Relation Age of Onset     Arthritis Mother         s/p hip replacement     Pulmonary embolism Mother 73        former smoker, recent drive to [place] for 5 hours     No Medical Problems Father      No Medical Problems Sister      No Medical Problems Daughter      No Medical Problems Maternal Grandmother      Pancreatic cancer Maternal Grandfather      Cancer Paternal Grandmother         ? type     at age 50     No Medical Problems Paternal Grandfather      No Medical Problems Maternal Aunt      No Medical Problems Paternal Aunt      BRCA 1/2 Neg Hx      Breast cancer Neg Hx      Endometrial cancer Neg Hx      Ovarian cancer Neg Hx      Clotting disorder Neg Hx      Social History     Socioeconomic History     Marital status:      Spouse name: None     Number of children: None     Years of education: None     Highest education level: None   Occupational History     None   Social Needs     Financial resource strain: None     Food insecurity:     Worry: None     Inability: None     Transportation needs:     Medical: None     Non-medical: None   Tobacco Use     Smoking status: Never Smoker     Smokeless tobacco: Never Used   Substance and Sexual Activity     Alcohol use: Yes     Alcohol/week: 0.6 oz     Types: 1 Glasses of wine per week     Drug use: No     Sexual activity: Never     Partners: Male   Lifestyle     Physical activity:     Days per week: None     Minutes per  session: None     Stress: None   Relationships     Social connections:     Talks on phone: None     Gets together: None     Attends Zoroastrian service: None     Active member of club or organization: None     Attends meetings of clubs or organizations: None     Relationship status: None     Intimate partner violence:     Fear of current or ex partner: None     Emotionally abused: None     Physically abused: None     Forced sexual activity: None   Other Topics Concern     None   Social History Narrative     None     Allergies    Allergies   Allergen Reactions     Penicillins Rash       Review of Systems    General  General (WDL): All general elements are within defined limits  ENT  ENT (WDL): Exceptions to WDL  Glasses or Contacts: Yes - Chronic (Greater than 3 months)  Tinnitus: Yes - Chronic (Greater than 3 months)  Sinus Congestion/Drainage: Yes - Recent (Less than 3 months)  Respiratory  Respiratory (WDL): Exceptions to WDL  Cough: Yes - Recent (Less than 3 months)  Cardiovascular  Cardiovascular (WDL): All cardiovascular elements are within defined limits  Endocrine  Endocrine (WDL): All endocrine elements are within defined limits  Gastrointestinal  Gastrointestinal (WDL): All gastrointestinal elements are within defined limits  Musculoskeletal  Musculoskeletal (WDL): All musculoskeletal elements are within defined limits  Neurological  Neurological (WDL): All neurological elements are within defined limits  Dominant Hand: Right  Psychological/Emotional  Psychological/Emotional (WDL): All psychological/emotional elements are within defined limits  Hematological/Lymphatic  Hematological/Lymphatic (WDL): Exceptions to WDL  Swollen glands: Yes - Recent (Less than 3 months)  Dermatological  Dermatologic (WDL): All dermatological elements are within defined limits  Genitourinary/Reproductive  Genitourinary/Reproductive (WDL): All genitourinary/reproductive elements are within defined limits  Reproductive (Females  "only)  Menstrual irritation or increase in discharge: No  Age at start of periods: 14  Last menstural cycle: 4/23/19  Number of pregnancies: 1  Age at first pregnancy: 40  Patient Pregnant?: No  Is the patient trying to get pregnant?: No  Is the patient on birth control: No  Pain  Currently in Pain: No/denies    Physical Exam    Recent Vitals 5/7/2019   Height 5' 8\"   Weight 173 lbs 14 oz   BSA (m2) 1.95 m2   /68   Pulse 92   Temp 98.7   Temp src 1   SpO2 100   Some recent data might be hidden       GENERAL: Alert and oriented. Seated comfortably. In no distress.    HEAD: Atraumatic and normocephalic.  Has a full head of hair.    EYES: CASEY, EOMI.  No pallor.  No icterus.    Oral cavity: no mucosal lesion or tonsillar enlargement.    NECK: supple. JVP normal.  No thyroid enlargement.    LYMPH NODES: She has palpable bilateral anterior and posterior cervical adenopathy, also left inguinal adenopathy    CHEST: clear to auscultation bilaterally.  Resonant to percussion throughout bilaterally.  Symmetrical breath movements bilaterally.    CVS: S1 and S2 are heard. Regular rate and rhythm.  No murmur or gallop or rub heard.    ABDOMEN: Soft. Not tender. Not distended.  No palpable hepatomegaly or splenomegaly.  No other mass palpable.  Bowel sounds heard.    EXTREMITIES: Warm.  No peripheral edema.    SKIN: no rash, or bruising or purpura.    CNS: Nonfocal      Lab Results    Recent Results (from the past 168 hour(s))   Lyme Antibody Cascade   Result Value Ref Range    Lyme Antibody Cascade <0.01 <0.90 Index Value   QTF-Mycobacterium tuberculosis by QuantiFERON-TB Gold Plus   Result Value Ref Range    QTF RESULT Negative Negative    QTF INTREPRETATION       No interferon-gamma response to M. tuberculosis antigens was detected.  Infecton with M. tuberculosis is unlikely.  A negative result alone does not exclude infection with M. tuberculosis    QTF NIL 0.03 IU/mL    QTF ANTIGEN TB1-NIL 0.00 IU/mL    QTF ANTIGEN " TB2 - NIL 0.00 IU/mL    QTF MITOGEN-NIL 7.55 IU/mL   Flow cytometry   Result Value Ref Range    Case Report       Flow Cytometry Report                             Case: [id number]                                    Authorizing Provider:  Anup Artis MD       Collected:           05/01/2019 1211              Ordering Location:     [facility] and Received:            05/01/2019 1211                                     Hematology                                                                   Pathologist:           Jose Osorio MD                                                        Specimen:    Peripheral Blood                                                                           Diagnosis       PERIPHERAL BLOOD, FLOW CYTOMETRY IMMUNOPHENOTYPIC CHARACTERIZATION:     - CD5(+) LAMBDA LIGHT CHAIN RESTRICTED MONOCLONAL B-CELL POPULATION    MCSS    Comment       The clinical history has been reviewed. Flow cytometry identifies a CD5(+) lambda light chain restricted monoclonal B-cell population that also expresses CD19, CD20, CD22, and CD23. The scatter plot features and immunophenotype favor CLL/SLL rather than Mantle cell lymphoma; however, clinical correlation is required. We may be able to send the remainder of the current specimen for FISH studies (CLL panel) if requested.    Phenotypic Data          CLL/Lymphoma Phenotyping Report     Phenotyping Description of Gated Cells      Source:Peripheral Blood  Case Reference/Related Case: [facility] (see Morph 4/26/19 [id number])      Antibody %    Dual         %   B-Related     Labeling    CD19  66       NA  CD20   67       67  CD22   63    UH79-ndl.   1  CD10   <1    BY32-img.   43  CD11c  11    Valeria./[place].*  0.02:1  FMC7   2    ZD63-90u   8  CD23   65    CD19-10   <1    1    OR08-FGM DR  65         FMC7-23   2        CD19-23   63        FA44-270   <1        OG09-852  <1        CD23-20   63        MP55-qun.    NA        CT65-zvk.  NA          Antibody %    Dual         %  T-Related     Labeling    CD7   32    CD3-4   22  CD5   97    CD3-8   9  CD3   32    CD7-5   29  CD2   34    CD4-8   <1   CD4   23    CD4/CD8* 2.4:1  CD8   10    CD56-38 2     CD56  4      CD25   62  CD57   NA  cTdt    NA      Antibody %    Dual         %  Myeloid/Monocytic/Misc.  Labeling      HLA-DR  67    AZ87-917   <1 All  CD45    100  CD34   <1 All  CD13   <1    <1 All   CD38   19  CD14   2      Cell count is approximately:14.4x10*3/uL  Viability is:NA    *=ratio  NA=antibody not run  camila.=kappa  gordon.=lambda     Charges       CPT:  01210 (25 markers)   ICD-10:  C85.90    CC: Herson Hopson MD    Result Flag Malignant (!) Normal   Lactate Dehydrogenase (LDH)   Result Value Ref Range    LD (LDH) 136 125 - 220 U/L   Comprehensive Metabolic Panel   Result Value Ref Range    Sodium 141 136 - 145 mmol/L    Potassium 4.0 3.5 - 5.0 mmol/L    Chloride 105 98 - 107 mmol/L    CO2 24 22 - 31 mmol/L    Anion Gap, Calculation 12 5 - 18 mmol/L    Glucose 94 70 - 125 mg/dL    BUN 13 8 - 22 mg/dL    Creatinine 0.72 0.60 - 1.10 mg/dL    GFR MDRD Af Amer >60 >60 mL/min/1.73m2    GFR MDRD Non Af Amer >60 >60 mL/min/1.73m2    Bilirubin, Total 0.5 0.0 - 1.0 mg/dL    Calcium 10.1 8.5 - 10.5 mg/dL    Protein, Total 6.4 6.0 - 8.0 g/dL    Albumin 4.5 3.5 - 5.0 g/dL    Alkaline Phosphatase 65 45 - 120 U/L    AST 11 0 - 40 U/L    ALT 13 0 - 45 U/L       Imaging Results    Xr Chest 2 Views    Result Date: 4/26/2019  EXAM: XR CHEST 2 VIEWS LOCATION: [facility] DATE/TIME: 4/26/2019 4:30 PM INDICATION: Cough COMPARISON: None. FINDINGS: Negative chest.    Mammo Screening Bilateral    Result Date: 4/24/2019  [facility] BILATERAL SCREENING MAMMOGRAM 4/24/2019 4:06 PM INDICATION: Breast cancer screening. COMPARISON: 01/20/2017, 09/04/2014 and 06/09/2010. TECHNIQUE: Bilateral screening full-field digital mammography with computer-aided detection. FINDINGS:  There are scattered areas of fibroglandular density. Both breasts are negative. However, the partially visualized right and left axillary lymph nodes appear larger than at prior mammograms. Recommend bilateral axillary ultrasound. Our staff will contact the patient for additional imaging. ACR BI-RADS Category 0: Incomplete: Need Additional Imaging Evaluation and/or Prior Mammograms for Comparison.    Us Breast Limited (focal) Bilateral    Result Date: 4/26/2019  UNILATERAL US BREAST LIMITED (FOCAL) BILATERAL 4/26/2019 3:41 PM INDICATION: Enlarged bilateral axillary lymph nodes-call back. COMPARISON: Mammograms from 4/24/2019, 1/20/2017, 9/4/2014, and 6/9/2010. ULTRASOUND FINDINGS: Bilateral axillary ultrasound was performed by both the ultrasonographer and the radiologist. Enlarged lymph nodes with thickened cortices are seen in the axilla bilaterally. One on the right measures 2.2 x 1.0 x 2.6 cm. One on the left measures 2.2 x 2.4 cm. Another measures 3.2 x 1.3 x 3 cm. Several similar-sized lymph nodes are present in the axilla bilaterally.     Enlarged lymph nodes with thickened cortices in the axilla bilaterally. There is a broad differential diagnosis including autoimmune disease, lymphoma, leukemia, and severe chronic infection. No abnormality is seen in the breast to suggest that this would be bilateral metastatic breast cancer. Further evaluation with physical exam, laboratory evaluation, and possible CTs as well as biopsy-confirmed benign cause is recommended. The findings and recommendations were discussed with patient and Dr. Hopson at the time of exam. Dr. Hopson then had an opportunity to speak to the patient prior to discharge from the [facility]. ACR BI-RADS Category 4: Suspicious. Results given to the patient.        Signed by: Meghann Waters MD

## 2021-05-29 NOTE — PROCEDURES
[place] [facility]:  Post Procedure Note ~    Procedure:   Cerebral angiogram    Radiologist:   Kristofer Farris MD    Contrast:   60 mL Omnipaque 300  20 mL Visipaque 320    Fluoro time:   7.8 minutes    Fluoro dose:   2313 mGy    Medications:   Versed 1.5 mg IV  Fentanyl 75 mcg IV    Sedation Time:   30 minutes    EBL:   Minimal    Complications:   None evident    Preliminary findings:   (see dictation for full detail)  - Left parietal AVM  - Left ICA extradural aneurysm    Assess/Plan:   Routine post sedation and groin site monitoring.  Bedrest x 4 hours after groin hemostasis obtained.  Report to Neurosurgery.  Discharge when meets criteria.    Kristofer Farris MD

## 2021-05-29 NOTE — PROGRESS NOTES
Contacted patient with results of CTA done yesterday.  This was done for further evaluation of a 9 mm cavernous left ICA aneurysm found on routine CT of the neck done for staging of her CLL.      In addition to the 1 cm left cavernous ICA there is note of an AV malformation in the left frontoparietal area measuring 2 x 1 cm.  Discussed with radiologist and then with neurosurgery.  Neurosurgery will set her up for angiogram and then a follow-up in their clinic to review results and make further recommendations.

## 2021-05-29 NOTE — PROGRESS NOTES
Discussed CT neck with radiology which revealed 8 x 9 mm cavernous left ICA aneurysm.  Could do either MRA or CTA for further evaluation.  Contact patient with the results to see if any additional imaging or intervention will be required.    Discussed with patient and will proceed with CTA for further evaluation.  Also recommend that she start EGCG at 2000 mg a day for treatment of CLL.  Will check CMP with CBC when she returns.

## 2021-05-29 NOTE — TELEPHONE ENCOUNTER
Left message for Maura to move her appointment with Dr. Corral up to Tuesday 6/25/2019 @ 9:00 per Dr. Corral.

## 2021-05-29 NOTE — PROGRESS NOTES
"[place] [facility]:  Pre Sedation assessment ~    48 year old female presents today for a diagnostic cerebral angiogram.    HPI:  CTA evidence for an incidental finding of left carotid fusiform  aneurysm and a left frontal parietal AVM.    Imaging:  CT/CTA head 5/29/2019 ~  HEAD CT:  1.  No evidence for acute hemorrhage, mass, obstructive hydrocephalus or definite acute infarct by CT.   2.  Mastoids and paranasal sinuses grossly clear.  HEAD CTA:   1.  All of the major large-caliber proximal intracranial vessels are patent including the vertebrobasilar system.   2.  As noted on the neck CT, there is a fusiform appearing aneurysm arising from the inferior limb of the left carotid siphon directed laterally measuring approximately 10 mm x 9 mm x 10 mm. No hemorrhage. There may be some mild bony remodeling from the   aneurysm. Location of this aneurysm would suggest that it is extradural.  3.  Also noted along the lateral superior left frontal parietal area is an arteriovenous malformation with multiple enlarged draining cortical veins and multiple hypertrophied left MCA branch vessels. It measures approximately 2 cm x 1 cm. Consultation   with neuro interventional radiology and neurosurgery recommended for possible conventional angiography for further assessment of this AVM. It is not associated with any edema or hemorrhage.  4.  Findings discussed with Dr. Waters on 05/30/2019 at approximately 9:00 AM.    Allergies:  Allergies   Allergen Reactions     Penicillins Rash     Labs:   Hgb 12.1, Plt 219, creat 0.73, UPT negative    Exam:   VS:  /68 (Patient Position: Sitting)   Pulse 68   Temp 98.9  F (37.2  C) (Oral)   Resp 18   Ht 5' 8\" (1.727 m)   Wt 172 lb 3.2 oz (78.1 kg)   SpO2 98%   BMI 26.18 kg/m    Gen:   No apparent distress at rest.  Neuro:  Alert and oriented x 3.  Nonfocal exam.  Cardiac:   S1 S2, no murmur  Lungs:  Clear to ascultation  Mallampati:   Class " II    IMPRESSION/PLAN:  This is a 48 year old female with a history of CLL and an incidental finding of a left carotid fusiform aneurysm and a left frontal parietal AVM.  She was referred here today for a diagnostic cerebral angiogram for further evaluation.  She is medically stable and appropriately NPO for procedure as planned using moderate IV sedation    The procedure its risks, benefits, and alternatives were discussed with the patient and her family. They express an understanding of the procedure and provide permission for us to proceed.  Informed consent obtained and signed.    Lalita Escalona, CNP

## 2021-05-29 NOTE — PROGRESS NOTES
Patient is here to review where she is at with her diagnoses.    1) CLL: recent diagnosis.  She would like to get a second opinion with [place] [facility] just to be sure she understood all of her options.  Discussed that this is a good idea so that at least she verifies for herself her treatment plan.  She is not quite comfortable with observation knowing her diagnosis.  She is a lot more anxious and tearful.  She has not yet discussed this with her daughter and is not sure if she should or not.  Discussed that I would recommend that she meet with a cancer therapist to help her process through these feelings and help decide when to talk to her daughter about what she is going through.  Her sleep is quite disturbed.  She is waking at 3-4 am each night and having difficulty getting back to bed.      2) Cranial vascular abnormalities:  Patient had a CT of the neck for staging the CLL and it was noted that she had a 8mm x 9mm ICA aneurysm.  She then had a dedicated CTA of the head which noted the same aneurysm but also left frontal parietal AVM at 2cm x 1cm.  Patient was confused as she was referred to neurosurgery but then received a call from [facility] to meet with Dr. Ovalles.  Discussed that Dr. Ovalles is a neurointerventional radiologist and that she should make that scheduled appointment with him as well as with neurosurgery to discuss treatment approaches and success rates for these two findings.    Patient Active Problem List    Diagnosis Date Noted     Aneurysm of carotid artery (H) 05/24/2019     CLL (chronic lymphocytic leukemia) (H) 05/07/2019     Dyslipidemia 02/06/2019     Tinnitus      Bloating (Symptom)      Cyst On The Right Ovary      Nontoxic Autonomous Thyroid Nodule        Current Outpatient Medications:      calcium carbonate (OS-CAROLINE) 600 mg (1,500 mg) tablet, Take by mouth daily. With Vitamin D   , Disp: , Rfl:      GREEN TEA EXTRACT ORAL, Take 2,000 mg by mouth daily., Disp: , Rfl:       "L.acidophilus-Bif. animalis (PROBIOTIC) 5 billion cell CpSP, Take 1 capsule by mouth daily., Disp: , Rfl:      multivitamin with minerals (THERA-M) 9 mg iron-400 mcg Tab tablet, Take 1 tablet by mouth daily., Disp: , Rfl:      psyllium (METAMUCIL) 3.4 gram packet, Take 1 packet by mouth daily., Disp: , Rfl:     Objective     /62 (Patient Site: Right Arm, Patient Position: Sitting, Cuff Size: Adult Regular)   Pulse 86   Ht 5' 8\" (1.727 m)   Wt 175 lb (79.4 kg)   SpO2 98%   BMI 26.61 kg/m    General appearance: alert, appears stated age and cooperative   Psych Exam:  Behavior: appropriate.  A little tearful when discussing talking about it with her daughter.  Mood:anxious   Affect:normal   Eye Contact:normal   Thought Content: normal   Insight:normal    Judgement: normal     Maura was seen today for follow-up.    Diagnoses and all orders for this visit:    Adjustment insomnia  -     traZODone (DESYREL) 50 MG tablet; Take 1 tablet (50 mg total) by mouth at bedtime.  Recommend that patient get hooked in with a cancer therapist.  It's okay if she would like to get a second opinion with [place] [facility]  She should keep both appointments with neurointerventional radiology and neurosurgery.  For sleep, I will start trazodone.  Discussed instructions for use, potential side effects and expected outcomes.  She can start at 1/2 a tablet if she would like.  Patient to notify me if her anxiety worsens and starts to interfere with her daily life.                  "

## 2021-05-29 NOTE — H&P
[facility]:    I have performed an assessment and examined the patient, as necessary, to update the patient's current status that may have changed since the prior History and Physical.  The History & Physical has been reviewed and the patient's status is unchanged.     Kristofer Farris MD

## 2021-05-29 NOTE — OR NURSING
Call into room for visual changes. States right visual field sees mirror type images/Kaleidoscope type images.C/O frontal headache 1/10 at 1115, 1140 headache now posterior lower head, dull 1/10. Call placed to Kelly DAVIS, updated. States the dye used can sometimes cause this and to give her tylenol for the headache and she will call back in about an hour and see if there is improvement.Full neuro assessment performed- no further changes.

## 2021-05-29 NOTE — TELEPHONE ENCOUNTER
NEUROSURGERY:    Discussed case with Dr. Waters. Incidental extradural ICA aneurysm and incidental L frontoparietal AVM discovered during workup for CLL. Please assist patient with arranging outpatient cerebral angiogram followed by appointment with Dr. William Corral .     Delmi Watts [facility]  [address] [place] [address], [address]  [place] [address]    O: [phone]  P: [phone]

## 2021-05-30 VITALS — WEIGHT: 173 LBS | BODY MASS INDEX: 26.22 KG/M2 | HEIGHT: 68 IN

## 2021-05-30 NOTE — TELEPHONE ENCOUNTER
Spoke w/pt. She is going to try and adjust her schedule to come earlier, but is unsure if she can.  I made her aware that her appointment will be interupted at times due to the OTV's. She said she will deal with it cause she is against rescheduling this appointment.  She will call back if she can come earlier then the 2:30 time.

## 2021-05-30 NOTE — PROGRESS NOTES
Neurosurgery consultation was requested by: Dr. Waters for incidental extradural ICA aneurysm and incidental Left frontoparietal AVM discovered during work up for CLL.  Patient is here with her SO. She denies any neurological signs or symptoms. Speech is fluent. Cognition is intact. Gait and balance are normal.  Kristyn Gusman RN, CNRN

## 2021-05-30 NOTE — PROGRESS NOTES
Pt arrived ambulatory with her  for consultation with Dr. Giordano for new dx of AVM. Pt was given new patient folder which included welcome letter, [place] Understanding RT, and SRS pathway. Pathway was explained in detail and pt verbalized their understanding. I showed her an aquaplast mask as an example. They were encouraged to call RN line with any questions, concerns, or decisions. Pt is feeling very well with no s/s of AVM or her CLL which was recently diagnosed.

## 2021-05-30 NOTE — PROGRESS NOTES
"  Dear Dr. Waters:  I had the pleasure of seeing Maura Elder in consultation in my neurosurgery clinic for an incidentally found arterial venous malformation of the left parietal lobe as well as a left extradural aneurysm.  As you well aware Lora is a very pleasant right-handed 48-year-old who was recently diagnosed with CLL and imaging was found to have an aneurysm which read to had CTA suggestive of a left parietal AVM in addition to a cavernous carotid extradural aneurysm.  She underwent a cerebral angiogram on 6/21/2019 which revealed a AVM in the left parietal lobe that 25 x 10 x 7 mm as well as a 11 x 6 9 x 10 mm extradural aneurysm of the left internal carotid artery in the cavernous segment.  Patient denies any tobacco abuse family history of cerebral aneurysms, intracranial hemorrhages, homocystinuria, connective-tissue disorders, autosomal dominant polycystic kidney disease, or Marfan's disease.    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  I reviewed the natural history of AVM as well as aneurysms with her and her .  I reviewed the treatment options including no treatment versus embolization and surgical resection versus embolization and states that he radiation.  Given patient's young age and the location of where this is I will obtain a brain MRI with a functional MRI to better evaluate the location.  However I do feel like this is high risk given the location for complication and given the compact nests of the tinnitus and " the size of the nidus I think would be a good candidate for stereotactic radiation.  I will refer her to my colleague Dr. Giordano for stereotactic radiosurgery as well as a functional MRI with her radiology team.  I will see her back in my clinic once consultation with Dr. Giordano and the MRI has been done.  I did spend 45 minutes, greater than 50% of which was in direct patient care, reviewing the images discussing the pathology and treatment plan with patient and her .  Thank you for giving me the opportunity to see this very pleasant patient if you have any questions about her or any other patients please feel free to contact me    William Corral MD, FAANS

## 2021-05-30 NOTE — TELEPHONE ENCOUNTER
Refill Approved    Rx renewed per Medication Renewal Policy. Medication was last renewed on 6/5/19.    Megan Granados, [facility] Triage/Med Refill 7/9/2019     Requested Prescriptions   Pending Prescriptions Disp Refills     traZODone (DESYREL) 50 MG tablet [Pharmacy Med Name: TRAZODONE 50 MG TABLET] 30 tablet 0     Sig: TAKE 1 TABLET BY MOUTH EVERYDAY AT BEDTIME       Tricyclics/Misc Antidepressant/Antianxiety Meds Refill Protocol Passed - 7/9/2019  1:41 AM        Passed - PCP or prescribing provider visit in last year     Last office visit with prescriber/PCP: 6/5/2019 Gale Rizo MD OR same dept: 6/5/2019 Gale Rizo MD OR same specialty: 6/5/2019 Gale Rizo MD  Last physical: 2/6/2019 Last MTM visit: Visit date not found   Next visit within 3 mo: Visit date not found  Next physical within 3 mo: Visit date not found  Prescriber OR PCP: Gale Rizo MD  Last diagnosis associated with med order: 1. Adjustment insomnia  - traZODone (DESYREL) 50 MG tablet [Pharmacy Med Name: TRAZODONE 50 MG TABLET]; TAKE 1 TABLET BY MOUTH EVERYDAY AT BEDTIME  Dispense: 30 tablet; Refill: 0    If protocol passes may refill for 12 months if within 3 months of last provider visit (or a total of 15 months).

## 2021-05-30 NOTE — PROGRESS NOTES
[facility] Radiation Oncology Consult Note    Patient: Maura Elder  MRN: [id number]  Date of Service: 07/17/2019    Assessment:     1. AVM (arteriovenous malformation) brain  Ambulatory referral to Oncology Psychotherapist    Ambulatory referral to Genetics   2. AVM (arteriovenous malformation) brain Left parietal lobe        Cancer Staging  No matching staging information was found for the patient.  ECOG Peformance Status  ECOG Performance Status: 0  Distress Assessment Score: 8(worried about tx, AVM, CLL)    Impression/Plan:   48 y.o. female with incidentally found AVM, 24.8 x 10.1 x 6.7 mm nidus in the left parietal lobe, no deep venous drainage.  Speech left hemisphere dominant but 2 cm away from nidus, finger tapping anterior to dominant draining vein per fMRI. Asymptomatic. Spetzler-Alex score of 1.     1. Spetzler-Alex AVM grading scale with score of 1. Small size, no deep venous drainage with nidus away from speech and activation for finger tapping is located just anterior to the dominant draining vein and venous aneurysm. Reviewed with neuroradiology who agrees with Spetzer score of 1.     2. We discussed risks and benefits, in detail, of radiosurgery including side effects as described below. The patient voices understanding of the information discussed and wishes to proceed forward with treatment. We will schedule CT sim for mid August as patient would like to be treated in September which is reasonable.   3. Have discussed with genetics regarding AVM. Patient reports uncle with history of AVM, will refer to genetics for further consideration of genetic testing    4. Patient overwhelmed from recent diagnosis of AVM, anyeursm, and CLL. Referral to Arcelia Cardenas provided as I feel she will benefit from this meeting.     Face to face time  60 minutes with > 75% spent on consultation, education and coordination of care.    Intent of Therapy: Curative     Side effects that may occur during or  within weeks after Radiation Therapy      Fatigue and general weakness    Headache and scalp irritation    Loss of hair    Nausea, vomiting, and decrease in appetite    Darkening, irritation, itchiness, redness, dryness, peeling, thickening, scabbing, and ulceration of the scalp, neck and forehead    Side effects that may occur months or years after Radiation Therapy      Development of another tumor or cancer           Dizziness and balance problems    Decrease in hormone production    Brain inflammation or necrosis that may cause various neurologic symptoms    Seizures    Decrease in memory and thinking abilities    Decrease in hearing and feeling of ear congestion    Eye dryness and irritation    Loss of vision    Cataracts in the eyes    Poor healing after a trauma or surgery in the irradiated area    Facial numbness, pain and weakness    Hemorrhage at site of AVM    The risks, benefits and alternatives to radiation therapy were outlined with the patient. All questions were answered and a consent was signed.    Subjective:      HPI: Maura Elder is a 48 y.o. female with a left parietal lobe AVM.     Patient with recent diagnosis of CLL which was originally discovered through axillary adenopathy that was present on mammogram in April 2019.  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 Further evaluation with CTA had showed an AVM in the lateral superior left frontal parietal area measuring approximately 2 cm x 1 cm. Subsequent IR cerebral angiogram showed the AVM to measure 24.8 x 10.1 x 6.7 mm nidus in the left parietal lobe, no deep venous drainage. A functional MRI was obtained on 7/03/2019, Wernicke's activation approximately 1.5 - 2cm from nidus.     The patient presents today to discuss radiation therapy. She has no complaints today and is doing well.      Prior Radiation: No  Concurrent Chemotherapy: No    Current  Outpatient Medications   Medication Sig Dispense Refill     calcium carbonate (OS-CAROLINE) 600 mg (1,500 mg) tablet Take by mouth daily. With Vitamin D             GREEN TEA EXTRACT ORAL Take 2,000 mg by mouth daily.       L.acidophilus-Bif. animalis (PROBIOTIC) 5 billion cell CpSP Take 1 capsule by mouth daily.       multivitamin with minerals (THERA-M) 9 mg iron-400 mcg Tab tablet Take 1 tablet by mouth daily.       psyllium (METAMUCIL) 3.4 gram packet Take 1 packet by mouth daily.       traZODone (DESYREL) 50 MG tablet TAKE 1 TABLET BY MOUTH EVERYDAY AT BEDTIME (Patient taking differently: TAKE 1 TABLET BY MOUTH EVERYDAY AT BEDTIME as needed) 90 tablet 3     No current facility-administered medications for this visit.      Past Medical History:   Diagnosis Date     AVM (arteriovenous malformation) brain      LOIS I (cervical intraepithelial neoplasia I)      HPV (human papilloma virus) infection      Leukemia (H)     CLL     Shingles      Past Surgical History:   Procedure Laterality Date     APPENDECTOMY  2012      SECTION  2011     IR CEREBRAL ANGIOGRAM  2019     Penicillins  Family History   Problem Relation Age of Onset     Arthritis Mother         s/p hip replacement     Pulmonary embolism Mother 73        former smoker, recent drive to [place] for 5 hours     No Medical Problems Father      No Medical Problems Sister      No Medical Problems Daughter      No Medical Problems Maternal Grandmother      Pancreatic cancer Maternal Grandfather      Cancer Paternal Grandmother         ? type     at age 50     No Medical Problems Paternal Grandfather      No Medical Problems Maternal Aunt      No Medical Problems Paternal Aunt      BRCA 1/2 Neg Hx      Breast cancer Neg Hx      Endometrial cancer Neg Hx      Ovarian cancer Neg Hx      Clotting disorder Neg Hx      Social History     Socioeconomic History     Marital status:      Spouse name: Not on file     Number of children: Not on  file     Years of education: Not on file     Highest education level: Not on file   Occupational History     Not on file   Social Needs     Financial resource strain: Not on file     Food insecurity:     Worry: Not on file     Inability: Not on file     Transportation needs:     Medical: Not on file     Non-medical: Not on file   Tobacco Use     Smoking status: Never Smoker     Smokeless tobacco: Never Used   Substance and Sexual Activity     Alcohol use: Yes     Alcohol/week: 0.6 oz     Types: 1 Glasses of wine per week     Drug use: No     Sexual activity: Never     Partners: Male   Lifestyle     Physical activity:     Days per week: Not on file     Minutes per session: Not on file     Stress: Not on file   Relationships     Social connections:     Talks on phone: Not on file     Gets together: Not on file     Attends Anglican service: Not on file     Active member of club or organization: Not on file     Attends meetings of clubs or organizations: Not on file     Relationship status: Not on file     Intimate partner violence:     Fear of current or ex partner: Not on file     Emotionally abused: Not on file     Physically abused: Not on file     Forced sexual activity: Not on file   Other Topics Concern     Not on file   Social History Narrative     Not on file        Review of Systems:        General  Constitutional (WDL): All constitutional elements are within defined limits  EENT  Eye Disorder (WDL): All eye disorder elements are within defined limits  Ear Disorder (WDL): All ear disorder elements are within defined limits  Respiratory       Respiratory (WDL): Within Defined Limits  Cardiovascular  Cardiovascular (WDL): All cardiovascular elements are within defined limits  Endocrine     Gastrointestinal  Gastrointestinal (WDL): All gastrointestinal elements are within defined limits  Musculoskeletal  Musculoskeletal and Connetive Tissue Disorders (WDL): All Musculoskeletal and Connetive Tissue Disorder  elements are within defined limits  Integumentary               Integumentary (WDL): All integumentary elements are within defined limits  Neurological  Neurosensory (WDL): All neurosensory elements are within defined limits  Psychological/Emotional   Patient Coping: Accepting  Hematological/Lymphatic  Lymph (WDL): All lymph disorder elements are within defined limits  Dermatologic     Genitourinary/Reproductive  Genitourinary (WDL): All genitourinary elements are within defined limits  Reproductive     Pain              Currently in Pain: No/denies   AUA Assessment                    Accompanied by  Accompanied by: Family Member()      Objective:     Physical Exam    Vitals:    07/17/19 1459   BP: 110/73   Pulse: 83   SpO2: 98%       GENERAL: No acute distress. Cooperative in conversation.   Eyes: Pupils are equal, round and reactive.   ENT:Oromucosa is clean and intact. .  RESP: Normal respiratory rate.  CV: Regular, rate and rhythm.  ABD: Soft, nontender.  MUSCULOSKELETAL: No palpable points of tenderness.   PSYCH: Within normal limits. No depression or anxiety.  SKIN: Warm dry intact.   LYMPH: No cervical, supraclavicular lymphadenopathy.  EXTREMITIES: No edema .  NEUROLOGIC: CNIII-XII symmetric, normal strength, sensation and reflexes throughout, gait normal     Recent Labs: No results found for this or any previous visit (from the past 168 hour(s)).    Imaging: Imaging results 30 days: Mr Head Functional With Radiologist    Result Date: 7/3/2019  EXAM: MR HEAD FUNCTIONAL W RAD LOCATION: [facility] DATE/TIME: 7/3/2019 12:44 PM INDICATION: Arteriovenous malformation and ICA aneurysm. COMPARISON: CTA head 5/29/2019. CONTRAST: 8 mL Gadavist. TECHNIQUE: HEAD MRI: Volumetric T2 and T1-weighted images of the head performed according to Stealth protocol with and without intravenous contrast. Diffusion tensor imaging was acquired and directionally encoded color maps were overlaid onto anatomical  "images. fMRI: Blood-oxygen-level-dependent (BOLD) contrast functional MRI (fMRI) was acquired at 3T using the following block-timing paradigms: Language: Word generation task contrasted with \"fixation\", from visual cues. Verb word generation task contrasted with \"fixation\", from visual cues. Sentence completion task contrasted with non-word containing random letters. Motor: Right finger tapping contrasted with rest, from visual cues. Right foot tapping contrasted with rest, from visual cues. Lip and tongue movement contrasted with rest, from visual cues. PROCESSING AND : BOLD functional data were post processed utilizing PatientFocus software. Statistical activation maps were thresholded, coregistered and overlaid onto volumetric images in 3 planes for evaluation. The patient self-reported compliance on all tasks. No significant motion degradation of image acquisition was identified. The patient is right hand dominant, as determined by cursory handedness inventory. Neurologic examination was normal. FINDINGS: HEAD MRI: INTRACRANIAL CONTENTS: No acute or subacute infarct. Left parietal arteriovenous malformation as demonstrated on previous studies. The nidus is demonstrated as multiple small flow voids along the superficial aspect of the parietal convexity measuring up to 2.7 cm in AP dimension (sagittal postcontrast series 74 image 151). The tangle of small vessels presumed to reflect the nidus extends along the parietal convexity inferiorly along the superior aspect of the supramarginal gyrus. Enlargement of the left middle cerebral artery and branches which supply the arteriovenous malformation. Dilated tortuous venous drainage along the postcentral sulcus to the superior sagittal sinus. There is a venous aneurysm within the segment along the parietal convexity measuring 10 x 9 mm. No mass, acute hemorrhage, or extra-axial fluid collections. Brain parenchymal signal is unremarkable. No hydrocephalus. " Outpouching along the lateral aspect of the cavernous internal carotid artery consistent with an aneurysm measuring up to 1 cm. OTHER: Accounting for technique there is no significant abnormality of the visualized orbits, paranasal sinuses, and mastoid air cells. DTI: No direct involvement of the white matter tracts by the superficial arteriovenous malformation. The corticospinal tract is medial and remote from the arteriovenous malformation. The arcuate fasciculus is also not involved, located deep and inferomedial with respect to the lesion. fMRI: LANGUAGE: Left hemisphere dominant language activation with candidate Broca's area identified in the left inferior frontal gyrus and a candidate Wernicke's area identified along the posterior left superior temporal sulcus. Minimal homologous activation within the right frontal lobe. Some additional activation within the supplementary motor area of the left superior frontal gyrus, posterior left middle frontal gyrus, and along the motor cortex consistent with activity planning. Speech activation in Wernicke's area  appears primarily along the posterior aspect of the superior temporal gyrus and along the perisylvian region, inferior to the nidus by approximately 1.5-2 cm. SENSORIMOTOR: Finger tapping motor paradigm demonstrates expected somatotopic distribution along the left central sulcus superiorly, just anterior to the aforementioned venous aneurysm. Foot tapping demonstrates negative activation within the right medial precentral gyrus and minimal positive activation along the left medial precentral gyrus, remote from the nidus but adjacent to the largest dominant draining vein at its junction with the superior sagittal sinus. Lip tongue motor task activation is identified in expected somatotopic distribution along the lateral central sulcus bilaterally, located anterior to the AVM nidus.     CONCLUSION: MRI HEAD: 1.  Left parietal arteriovenous malformation with  superficial nidus measuring up to 2.7 cm in AP dimension. 2.  Arterial supply from left middle cerebral artery branches is better described on the previous conventional angiogram. 3.  Dominant venous drainage cephalad to the superior sagittal sinus with associated venous aneurysm measuring up to 1 cm. 4.  DTI demonstrates no significant white matter tract involvement by the superficial arteriovenous malformation. fMRI: 1.  Left hemisphere dominance for both expressive and receptive language. Wernicke's activation along the posterior perisylvian region is approximately 1.5-2 cm from the nidus. 2.  Expected somatotopic distribution of motor activation for finger and lip/tongue paradigms. Contralateral activation for foot paradigm. The activation for finger tapping is located just anterior to the dominant draining vein and venous aneurysm.    Ir Cerebral Angiogram    Result Date: 6/21/2019  [facility] INTERVENTIONAL NEURORADIOLOGY 6/21/2019 10:09 AM 1. CERVICAL ANGIOGRAM: SELECTIVE CATHETERIZATION OF THE RIGHT COMMON CAROTID ARTERY AND LEFT COMMON CAROTID ARTERY WITH ANGIOGRAPHIC IMAGING CENTERED OVER THE NECK 2. CEREBRAL ANGIOGRAM: SELECTIVE CATHETERIZATION OF THE RIGHT COMMON CAROTID ARTERY AND LEFT INTERNAL CAROTID ARTERY WITH ANGIOGRAPHIC IMAGING CENTERED OVER THE HEAD 3. CEREBRAL ANGIOGRAM: SELECTIVE CATHETERIZATION OF THE LEFT EXTERNAL CAROTID ARTERY WITH ANGIOGRAPHIC IMAGING CENTERED OVER THE HEAD 4. CEREBRAL ANGIOGRAM: SELECTIVE CATHETERIZATION OF THE RIGHT VERTEBRAL ARTERY AND LEFT VERTEBRAL ARTERY WITH ANGIOGRAPHIC IMAGING CENTERED OVER THE HEAD 5. THREE-DIMENSIONAL ROTATIONAL ANGIOGRAM OF THE HEAD WITH IMAGE PROCESSING ON A SEPARATE COMPUTER WORKSTATION: INJECTION OF THE LEFT COMMON CAROTID ARTERY INDICATION: History of chronic lymphocytic leukemia and incidentally detected aneurysm and AVM on neuroimaging evaluation. Cerebral angiography requested for further definition of these findings to  determine management.. CONSENT: The procedure and its indications, major risks, benefits, and alternatives were discussed. Risks including, but not limited to, arterial perforation or dissection, stroke, death, groin hematoma, pain, allergic reaction, renal damage, radiation-related complications, and cardiac or pulmonary complications of sedation, were discussed. Understanding was acknowledged, and a signed informed consent was obtained. MODERATE SEDATION: Versed 1.5 mg. Fentanyl 75 mcg. The procedure was performed with the administration of intravenous conscious sedation with appropriate preoperative, intraoperative, and postoperative evaluation. 30 minutes of supervised face to face conscious sedation time was provided by a radiology nurse under my direct supervision. During the time-out, immediately prior to administration of medications, the patient was re-assessed for adequacy to receive conscious sedation. MEDICATIONS: Versed 1.5 mg IV Fentanyl 75 mcg IV CONTRAST: 60 cc Omnipaque 300, 20 cc of Visipaque 320 FLUOROSCOPIC TIME: 7.8 minutes DOSE: Air Kerma: 2313 mGy ESTIMATED BLOOD LOSS: Minimal PERFORMING PHYSICIAN(S): Kristofer Farris M.D. PROCEDURE: The patient was placed supine upon the neuroangiography table. The skin of both groins was prepped and draped in sterile fashion. Under local anesthesia with lidocaine and sterile technique, the right common femoral artery was percutaneously accessed with a 4 Bahraini micropuncture set. A short 5 Bahraini sheath was then placed. A 5 Bahraini diagnostic catheter (AutoMedx) was then advanced over an angled Glidewire into the aortic arch and used to selectively and subselectively catheterize the following arteries: right common carotid artery, right vertebral artery, left common carotid artery, left internal carotid artery, left vertebral artery. Digital subtraction angiograms of the head/neck were performed in multiple projections with these catheterizations. The left  common carotid artery was injected in order to obtain three-dimensional rotational angiographic images of the head. The three-dimensional angiographic source images were transferred to a separate computer workstation where they  were post-processed with three-dimensionally enhancing software in order to better characterize the size, morphology, and relationship to adjacent vessels of a left internal carotid artery aneurysm and left hemispheric arteriovenous malformation. Three-dimensional post-processing was performed under my concurrent supervision. The catheter and sheath were then removed. Hemostasis was achieved with direct compression of the groin. The procedure appeared well-tolerated. There was no apparent complication. COMPARISON: CTA head 5/29/2019. FINDINGS: CRANIAL/CERVICAL CIRCULATION: Images of the right common carotid artery centered over the neck demonstrate mild irregularity of the ICA origin. No hemodynamically significant stenosis of the ICA origin by NASCET criteria. Images of the right common carotid artery centered over the head demonstrate no evidence of intracranial aneurysm, vascular malformation or stenosis. Venous phase within normal limits. Mild irregularity of the carotid siphon without a discrete aneurysm. Images of the left common carotid artery centered over the neck demonstrate minimal irregularity of the ICA origin. No hemodynamically significant stenosis of the ICA origin by NASCET criteria. Images of the left common, external and internal carotid arteries, including 3-D rotational angiographic images of the common carotid artery, centered over the head demonstrate two primary findings of note: Arteriovenous malformation with a diffuse 24.8 x 10.1 x 6.7 mm nidus located in the left parietal lobe. The anterior compartment of the nidus is more compact and measures 5.2 x 10.1 x 6.7 mm. Arterial supply arises primarily from three hypertrophied right MCA branches, with the more anterior  two branches supplying the compact component of the nidus. All branches contribute innumerable pedicles with a  combination of direct and en passage supply. No evidence of perinidal or definite intranidal aneurysm. There are two primary venous outlets. The dominant venous outlet arises from the superior aspect of the compact portion of the nidus. There is rapid shunting into this markedly ectatic vein, which drains into the superior sagittal sinus. A venous aneurysm arises from this vein immediately as it exits the nidus, measuring 9.5 x 8.2 x 10.4 mm. An additional minor draining vein arises from the more diffuse posterior aspect of the nidus, converging with the larger vein before entering the superior sagittal sinus. No evidence of venous outflow stenosis. No deep venous drainage. 11.6 x 9.4 x 10.3 mm inferiorly projecting saccular extradural aneurysm of the internal carotid artery cavernous segment with a relatively narrow 4.5 mm neck.  Images of the right and left vertebral arteries centered over the head demonstrate no evidence of intracranial aneurysm, vascular malformation or stenosis. Venous phase within normal limits.     CONCLUSION: 1. Arteriovenous malformation with a diffuse 24.8 x 10.1 x 6.7 mm nidus located in the left parietal lobe. The anterior compartment of the nidus is more compact and measures 5.2 x 10.1 x 6.7 mm. Arterial supply arises primarily from three hypertrophied right MCA branches, with the more anterior two branches supplying the compact component of the nidus. All branches contribute innumerable pedicles with a  combination of direct and en passage supply. No evidence of perinidal or definite intranidal aneurysm. There are two primary venous outlets. The dominant venous outlet arises from the superior aspect of the compact portion of the nidus. There is rapid shunting into this markedly ectatic vein, which drains into the superior sagittal sinus. A venous aneurysm arises from this  vein immediately as it exits the nidus, measuring 9.5 x 8.2 x 10.4 mm. An additional minor draining vein arises from the more diffuse posterior aspect of the nidus, converging with the larger vein before entering the superior sagittal sinus. No evidence of venous outflow stenosis. No deep venous drainage. 2. 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 3. No evidence of additional intracranial aneurysm. Results were discussed with the patient's family and conveyed to the neurosurgical team immediately following the procedure. Evaluation and stenosis determination based on NASCET criteria. _____________________________________________ Kristofer Farris M.D. Interventional Neuroradiologist [place] [facility] Office: [phone] Pager: [phone] Cell: [phone] CPT codes included for physician reference only: 23009, +[id number], 04545, 72992 (50 modifier), 83493, 43895, 99153 x1       Pathology:   No results found for this or any previous visit (from the past 8760 hour(s)).    I, Maura Giordano MD personally performed the services described in this documentation, as scribed by Sukhdev Gibson in my presence, and it is both accurate and complete.    Signed by: Maura Giordano MD, MPH

## 2021-05-31 VITALS — HEIGHT: 68 IN | WEIGHT: 175.5 LBS | BODY MASS INDEX: 26.6 KG/M2

## 2021-05-31 VITALS — BODY MASS INDEX: 26.3 KG/M2 | WEIGHT: 173 LBS

## 2021-05-31 VITALS — BODY MASS INDEX: 26 KG/M2 | WEIGHT: 171 LBS

## 2021-05-31 NOTE — PROGRESS NOTES
Dear Dr. Rizo:  I had the pleasure of seeing Lora Elder in follow up in my neurosurgery clinic to discuss the functional MRI results.  I did review the imaging with her and her  who accompanies her today and again discussed the risks benefits alternatives of craniotomy and resection versus stereotactic radiation plus or minus preoperative embolization.  Given that she has a venous  aneurysm her risk of latent hemorrhage will be higher than if she were not to have her aneurysm.  However I would still recommend straight leg radiation as this is eloquent brain and the likelihood of a permanent deficit postoperatively is significant.  She did get a second opinion from my colleague Dr. Zhu who did in fact recommend craniotomy resection.  I reviewed my thoughts about the risks and benefits of each treatment option.  They will plan on stereotactic radiation.  I will discuss this with my radiation oncology colleague Dr. Giordano as well as interventional radiology colleague Dr. Farris as well.   Thank you for giving me the opportunity to see this very pleasant patient.  If you have any questions about Lora or any other patients please feel free to contact me.  Of note I spent greater than 30 minutes counseling the patient regarding her pathology and treatment plan, greater than 50 percent of which was in direct counseling.    William Corral MD, FAANS

## 2021-05-31 NOTE — TELEPHONE ENCOUNTER
After several discussion with Dr Farris from neuro IR, we agreed that embolization is not nessary, so will proceed with SRS. However need to repeat CTA last 5/2019 due to dimensions differing from fMRI. SInce MRI/MRA will be used for f/u with will get new MRI as baseline. DIscussed with Lora who verbalizes understanding.

## 2021-05-31 NOTE — PROGRESS NOTES
Psychology Psychotherapy  Note    Name:  Maura Elder  :  1970  MRN:  [id number]      Date of Service: 2019  Duration: 60 minutes (9:00-10:00)    Target Symptoms:    The patient was seen in light of concerns regarding symptoms of anxiety and worry as evidenced by patient and staff report.    Participation:  The patient was able to participate and benefit from treatment as evidenced by her verbal expression of ideas and initiation of topics discussed.    Mental Status:    Mood:  anxious and sad  Affect:  tearful  Suicidal Ideation:  absent  Homicidal Ideation:  absent  Thought process:  normal  Thought content:  Normal  Fund of Knowledge:  Sufficient  Attention/Concentration:  intact  Language ability:  intact  Speech: normal  Memory:  recent and remote memory intact  Insight and Judgement:  good  Orientation:  person, place, time and situation  Appearance: well groomed, and engaged,  Eye Contact:  Appropriate  Estimated IQ:  Above Average      Intervention:    Lora was referred to be by Dr. Giordano meet for emotional support surrounding her current health condition.    Lora indicates that she has been in very good health for all of her life.  She went in for a routine mammogram on 2019 and it was concern about her lymph nodes with this evaluation.  She had a further work-up and was diagnosed with CLL.  In doing this further work-up, she had a CT that discovered ICA aneurysm.  She then had an additional diagnostic work-up and it was discovered that she had an AVM in the lateral superior left frontal partial area measuring approximately 2 cm x 1 cm.  She is followed by Dr. Dyson from [facility] in [place] for oncology.  She had a second opinion at the [facility] in [place].  She has decided to go with the plan of care as outlined by Dr. Giordano.  She indicates that she will need to have an embolization and then radiation therapy.  She was scheduled for  her simulation yesterday, but it is being delayed till after the embolization.  She also met with our genetic counselor yesterday.    Lora indicates that she is feeling very overwhelmed with her diagnosis.  She is not used to being sick and states that she has had her 20 medical appointments in the past 120 days.  She is experiencing some normal fear and anxiety related to her newly diagnosed health issues and her upcoming medical treatments.    We discussed issues related to Lora's mental health.  She completed the PHQ 9 and had a score of 0 indicating no depression.  She also completed the HECTOR-7 had a score of 12 indicating moderate anxiety.  Lora indicates that she does not have any significant mental health history, but has seen a therapist in the past to deal with some family issues.  She met with a therapist, Areli from [facility] in [place].  She met with her throughout the course of about 2 years.  She started seeing the therapist as she was and conflict with her parents and sister regarding politics.  While she was going through her therapy, a very close family friend who she has known since she was 8 years old had 13-year-old daughter who  of AML.  This loss was very difficult for Lora and she was able to work through her loss and the issues with her family through the therapy process.  Lora denies suicidal ideation.  She does not meet diagnostic criteria for a mood disorder or an anxiety disorder.  She is experiencing some normal symptoms of worry and anxiety related to her diagnosis and upcoming procedures.    Lora has been  to her , Wilmer for 11 years.  They met at [employer].  She was 38 years old when they  and he was 40 years old.  They have an 8-year-old daughter, Rozina, age 8 who is a third grader at [employer].  Lora feels very fortunate to have a daughter she was 40 years old when she gave birth.  She has a very close and loving relationship with her   and her daughter.    Lora was raised in [place].  Her parents are both 74 years old and retired and in good health.  She also has a sister, Laura who is  and has 2 children.  Her sister and her family also lives in [place].  She indicates that she has an extremely close and loving relationship with her family.  They have been very involved and supportive throughout her cancer journey.  She states that the one time that she had conflict with them was regarding political issues that have transpired over the past few years.  She has reached resolution with these issues through her therapy process.    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  She has worked for this company for 14 years.  Her  is  at [employer] of college and he is in the process of finishing his PhD.    Lora's demetrius and spirituality is very important to her.  She is a [employer] who attends [employer] in [place].  We discussed ways that she can incorporate her demetrius through this medical journey.    Psychoterapeutic Techniques:  Cognitive-behavioral therapy, motivational interviewing and supportive psychotherapy strategies were utilized.    Necessity:    The session was necessary for the care of the patient to address symptoms of anxiety and worry related to the patient's medical condition.    Progress:    Lora shared the story of her medical journey that has been going on since April.  She processed her thoughts feelings and fears regarding her diagnosis and upcoming procedures.  We discussed strategies to help her work through her feelings of anxiety.  Lora finds it very helpful to process her thoughts and feelings.  She has found therapy to be helpful in the past to release her feelings and to create strategies to help her deal with them.  She is interested in meeting with me on an ongoing basis throughout her medical journey.  She  plans to contact me when plans are performed in place for some of her upcoming procedures.  I also will be available to meet with her  and daughter as needed.    Plan:    Lora will contact me when her upcoming procedures are scheduled so we can meet again for her to further process her thoughts and feelings about her medical journey.    Diagnosis:    1. Adjustment reaction with mixed disturbance of emotions and conduct    2. AVM (arteriovenous malformation) brain    3. Aneurysm of carotid artery (H)    4. CLL (chronic lymphocytic leukemia) (H)        Problem List:  Patient Active Problem List   Diagnosis     Bloating (Symptom)     Cyst On The Right Ovary     Nontoxic Autonomous Thyroid Nodule     Tinnitus     Dyslipidemia     CLL (chronic lymphocytic leukemia) (H)     Aneurysm of carotid artery (H)     AVM (arteriovenous malformation) brain Left parietal lobe        Provider: Arcelia Cardenas MA, LP, LICSW    Date:  8/22/2019  Time:  10:50 AM

## 2021-05-31 NOTE — PROGRESS NOTES
"8/20/2019    Referring Provider: Dr. Giordano    Presenting Information:   I met with Maura Elder today for genetic counseling at the [facility] to discuss her recent diagnosis of an AVM in her left parietal lobe as well as her family history of an AVM as well as her personal history of CLL and family history of pancreatic, ovarian, and colon cancers.  She is here today to review this history and available genetic testing options.    Personal History:   Maura is a 49 y.o. year old female.  She was recently diagnosed with CLL as the result of a suspicious lymph nodes on her most recent mammogram. 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 She is being followed by Dr. Giordano and will be undergoing radiosurgery in  September.     Family History: Family history was obtained today (Please see scanned pedigree for detailed family history information):    Lora's sister had a daughter who was a stillborn due to anencephaly. She has two other healthy children.    Lora's mother, age 74, has a history of a pulmonary embolism at age 73. Lora also reports that her mother may have born with a possible hip birth defect. She reports that her mother has has colon polyps removed on her colonoscopy, but she in unsure of the type and the number.    Lora's maternal grandfather passed away at age 81 from pancreatic cancer. He also had a history of pancreatitis.    Lora's maternal great grandfather passed away with a history of colon cancer.    One of Lora's maternal great aunts passed from a \"female\" cancer.    One of Lora's maternal great aunts passed from a brain tumor.    Lora's father, age 74, has a history of Meniere's disease, pericarditis, and Afib. He has had less than 10 colon polyps removed across his colonoscopies.    Lora's paternal uncle, age 81, has a history of an AVM in " INTERVAL HPI/OVERNIGHT EVENTS:    SUBJECTIVE: Patient seen and examined at bedside.     unable to obtain ros    OBJECTIVE:    VITAL SIGNS:  Vital Signs Last 24 Hrs  T(C): 35.8 (19 Sep 2020 05:00), Max: 35.9 (18 Sep 2020 21:14)  T(F): 96.4 (19 Sep 2020 05:00), Max: 96.6 (18 Sep 2020 21:14)  HR: 77 (19 Sep 2020 05:00) (72 - 77)  BP: 178/86 (19 Sep 2020 05:00) (159/77 - 178/86)  BP(mean): --  RR: 18 (19 Sep 2020 05:00) (18 - 18)  SpO2: --      PHYSICAL EXAM:    General: pt refused exam    MEDICATIONS:  MEDICATIONS  (STANDING):  amLODIPine   Tablet 5 milliGRAM(s) Oral daily  chlorhexidine 4% Liquid 1 Application(s) Topical <User Schedule>  diVALproex  milliGRAM(s) Oral two times a day  enoxaparin Injectable 40 milliGRAM(s) SubCutaneous daily  haloperidol    Injectable 5 milliGRAM(s) IntraMuscular once  levETIRAcetam 750 milliGRAM(s) Oral two times a day  OLANZapine 5 milliGRAM(s) Oral daily    MEDICATIONS  (PRN):  haloperidol     Tablet 0.5 milliGRAM(s) Oral every 8 hours PRN agitation  polyethylene glycol 3350 17 Gram(s) Oral two times a day PRN Constipation      ALLERGIES:  Allergies    No Known Allergies    Intolerances        LABS:              Creatinine Trend: 0.7<--, 0.8<--        hs Troponin:              CSF:                      EKG:   MICROBIOLOGY:    IMAGING:      Labs, imaging, EKG personally reviewed    RADIOLOGY & ADDITIONAL TESTS: Reviewed. his brain. He also has a history of a heart attack, kidney stones, seizures, and Afib.    Lora's paternal uncle passed away at age 82 with a history of a benign brain tumor in his early 70's.    Lora's paternal grandmother passed away at age 51 with a history of ovarian cancer at age 50.     Lora's paternal great uncle passed from colon cancer at age 85.    Her maternal ethnicity is [place], [place]ian, and [place]. Her paternal ethnicity is [place] and [place].  There is no reported consanguinity.    Discussion-Family History of Cancer:    Maura's family history of pancreatic and ovarian cancer is suggestive of a hereditary cancer syndrome.    We reviewed the features of sporadic, familial, and hereditary cancers. In looking at Maura's family history, it is possible that a cancer susceptibility gene is present due to her maternal grandfather's pancreatic cancer and her paternal grandmother's ovarian cancer.    We discussed the natural history and genetics of hereditary gastrointestinal and gynecologic cancers.     We reviewed that the most common cause of hereditary breast cancer is Hereditary Breast and Ovarian Cancer (HBOC) syndrome, which is caused by mutations in the genes BRCA1 and BRCA2.  BRCA1 and BRCA2 are two genes that increase the risk for breast and ovarian cancers, among others. Women who inherit a BRCA mutation have a 50 to 85% lifetime risk of breast cancer and a 15 to 45% lifetime risk of ovarian cancer. This is higher than the general population lifetime risks of 12% for breast cancer and less than 2% for ovarian cancer. Men with BRCA gene mutations have up to a 7% risk of breast cancer and 20% risk of prostate cancer. Other cancers, such as pancreatic cancer and melanoma, have also been associated with BRCA mutations.    Given the family history of pancreatic and more distantly colon cancer, we discussed nuñez syndrome. Nuñez syndrome can be caused by a mutation in one of five genes:  MLH1,  MSH2, MSH6, PMS2, and EPCAM.  Parnell syndrome may present with polyps, but typically a small number.  The highest cancer risks associated with Parnell syndrome include colon cancer, endometrial/uterine cancer, gastric cancer, and ovarian cancer.    Based on her personal and family history, Maura meets current National Comprehensive Cancer Network (NCCN) criteria for genetic testing of BRCA1 and BRCA2.      We discussed that there are additional genes that could cause increased risk for pancreatic and gynecologic cancers. As many of these genes present with overlapping features in a family and accurate cancer risk cannot always be established based upon the pedigree analysis alone, it would be reasonable for Maura to consider panel genetic testing to analyze multiple genes at once.    After our discussion, Mary expressed interest in CancerNext genetic testing through [facility].   Genetic testing is available for 34 genes associated with hereditary cancer: CancerNext (APC, SUZANNA, BARD1, BRCA1, BRCA2, BRIP1, BMPR1A, CDH1, CDK4, CDKN2A, CHEK2, DICER1, EPCAM, GREM1, HOXB13, MLH1, MRE11A, MSH2, MSH6, MUTYH, NBN, NF1, PALB2, PMS2, POLD1, POLE, PTEN, RAD50, RAD51C, RAD51D, SMAD4, SMARCA4, STK11, and TP53).  We discussed that many of the genes in the CancerNext panel are associated with specific hereditary cancer syndromes and published management guidelines: Hereditary Breast and Ovarian Cancer syndrome (BRCA1, BRCA2), Parnell syndrome (MLH1, MSH2, MSH6, PMS2, EPCAM), Familial Adenomatous Polyposis (APC), Hereditary Diffuse Gastric Cancer (CDH1), Familial Atypical Multiple Mole Melanoma syndrome (CDK4, CDKN2A), Juvenile Polyposis syndrome (BMPR1A, SMAD4), Cowden syndrome (PTEN), Li Fraumeni syndrome (TP53), Peutz-Jeghers syndrome (STK11), MUTYH Associated Polyposis (MUTYH), and Neurofibromatosis type 1 (NF1).   The SUZANNA, BRIP1, CHEK2, GREM1, NBN, PALB2, POLD1, POLE, RAD51C, and RAD51D genes are associated with  increased cancer risk and have published management guidelines for certain cancers.    The remaining genes (BARD1, DICER1, HOXB13, MRE11A, RAD50, and SMARCA4) are associated with increased cancer risk and may allow us to make medical recommendations when mutations are identified.      Medical Management: For Maura, we reviewed that the information from genetic testing may determine:    additional cancer screening for which Maura may qualify (i.e. mammogram and breast MRI, more frequent colonoscopies, more frequent dermatologic exams, etc.),    options for risk reducing surgeries Maura could consider (i.e. bilateral mastectomy, surgery to remove her ovaries and/or uterus, etc.),      and targeted chemotherapies for Maura if she were to develop certain cancers in the future (i.e. immunotherapy for individuals with Parnell syndrome, PARP inhibitors, etc.).     These recommendations and possible targeted chemotherapies will be discussed in detail once genetic testing is completed.       Discussion- Personal History of Cerebral AVM    We reviewed the features and genetics of HTT.  We discussed that HHT causes problems with blood vessel development. Individuals with HHT tend to have fewer small blood vessels called capillaries. Fewer capillaries can increase the pressure and blood flow in other vessels, decreasing oxygen transport, and/or causing vessels to burst.     We discussed that common symptoms of HHT include:    Telangiectasias - typically of the lips, hands, face, intestines, and around the mouth    Arteriovenous malformations (AVMs) - typically of the liver, lungs, brain, and spine    Nosebleeds    GI bleeding    Other symptoms, including migraine, seizures, stroke, back pain, and in some cases, heart failure.     Lora has a cerebral AVM but denies having telangectasias, nosebleeds, GI bleeding, and the other features related to HHT. Of note, her paternal uncle has a history of a cerebral AVM,  heart failure, and seizures. She denies further family history of the features assocaited with HHT.    Symptoms of HHT can present differently in individuals, even within the same family. Individuals with HHT tend to experience more symptoms as they get older, however, children can present with symptoms of HHT. We discussed that at-risk children and adults should have screening for manifestations of HHT under the care of a physician who is knowledgeable about the condition.    HHT genetic testing includes six genes: ENG, ACVRL1, EPHB4, SMAD4, GDF2, and RASA1. The ENG, ACVRL1, SMAD4, and GDF2 genes have been associated with HHT. EPHB4 and RASA1 are associated with capillary malformation-arteriovenous malformation syndrome (CM-AVM), which can have similar features to HHT.    About 85% of cases of HHT are caused by ENG or ACVRL1 mutations.    We reviewed that the SMAD4 gene is associated with juvenile polyposis/hereditary hemorrhagic telangiectasia syndrome. Individuals with mutations in this gene are also at risk to develop particular kinds of gastrointestinal polyps (juvenile polyps), and are at risk for certain gastrointestinal cancers, including stomach and colon cancer.     We discussed the autosomal dominant inheritance of HHT. All children (both males and females) of a parent with a mutation in an HHT gene have a 50% chance of inheriting the same gene mutation.  Those who do not inherit the gene mutation causative of HHT cannot pass it on to their children (i.e., it cannot skip generations).     Genetic testing is available for: HHTNext at [facility], which involves sequencing and deletion/duplication analysis of ENG, EPHB4, ACVRL1, SMAD4, GDF2, and RASA1.      Genetic testing could confirm a diagnosis of HHT in Maura, and would allow for genetic testing of her family members. This would help identify at-risk relatives to ensure early and appropriate screening.  Should testing identify a SMAD4 mutation,  appropriate cancer screening would also be recommended for Maura.     Lora expressed the desire to move forward with genetic testing for both her family history of cancer and HHT. We discussed that given her diagnosis of CLL, the laboratory may deny running the genetic testing on Lora's blood. I have spoken with a representative from [facility], and they suggested either sending in a blood sample keeping in mind it could be deemed insufficient to run genetic testing in which case skin biopsy would be recommended. Alternatively, I could try and send her most recent CBC and flow cytometry to [facility] to see if they could determine if a blood sample would be sufficient from these records. After our discussion, Lora opted to send CBC and flow cytometry records to the lab to see if a blood sample would be possible.     Plan:  1. I will send most recent bloodwork to [facility].  2. I will contact Lora to discuss next steps as soon as I hear back from the laboratory.     Face to face time: 55 minutes    Kelsey Hutchinson MS, [facility]  Licensed Genetic Counselor  [facility]  [phone]

## 2021-05-31 NOTE — PROGRESS NOTES
I met with Lora in the lobby today.  She is here to meet with Arcelia Cardenas.  I introduced myself and explained my role.  I gave her my contact information.  Encouraged her to call me if she has any questions or concerns.  She appreciated the visit.  I told her I will check in again with her.

## 2021-05-31 NOTE — TELEPHONE ENCOUNTER
Pt's case was reviewed at Neuro/brain tumor board on 8-15-19.  Imaging was reviewed concerning left parietal AVM. She also had fMRI.  Surgery would be high risk (aphasia, hemiplasia) .  Final group consensus was to embolize and have radiosurgery.   Pt has appt with Dr. Giordano tomorrow and this plan will be presented.   Queenie Lopez RN

## 2021-05-31 NOTE — TELEPHONE ENCOUNTER
Called Lora to let her know we would not be planning her SRS today as after discussing with Dr Corral we will proceed with embolization prior to radiation due to concern of exiting venous aneurysm and chance of rupture in latency period.  We discussed that embolization can reduce the obliteration rate with SRS, but if the concern of a bleed in latency is high enough then we proceed with embolization.  She verbalized understanding.  Dr Kaleigh berman this week, will connect next week to schedule.

## 2021-06-01 VITALS — WEIGHT: 175 LBS | BODY MASS INDEX: 26.61 KG/M2

## 2021-06-01 NOTE — PROGRESS NOTES
Pt's UPT negative and able to proceed with tx    Pt ambulatory with family for tx, d/c instructions given. Informed to call with any questions or concerns. Follow up to be made on discharge.

## 2021-06-01 NOTE — PROGRESS NOTES
Exam: CT simulation     Date: 9/6/2019  There were no vitals filed for this visit.    Please ask your patient the following questions before you give any injection of a contrast media. If someone other than the patient is responding to these questions, please indicate the respondent's name and relationship to the patient.    Respondent Name: Lora                 Relationship to patient:patient    Name: Maura Nunez any allergies:   Allergies   Allergen Reactions     Penicillins Rash       Does the patient have renal disease?     No  Does the patient have diabetes?   No  If patient has diabetes, is metformin or metformin combination drug currently prescribed (i.e. Actoplus Met, Avandmet, Fortamet, Glucophage, Glucovance, Glumetza, Junamet, Prandimet, Metaglip, or Riomet)?       No  Is the patient pregnant? No  Is the patient on dialysis? No  Does the patient have cancer? Yes, CLL  Does the patient have a history of cancer? No  When was the patient diagnosed? 5/2019  Treatment: none, on observation  Date of last chemo treatment: NA  NPO since 0745 today.   UPT was negative today.     LAB RESULTS       CREATININE       Lab Results   Component Value Date    CREATININE 0.73 06/21/2019           (Normal Range: Male: 0.6-1.5 mg/dL  Female: 0.5-1.3mg/dL)   (A Creatinine result greater than 6.0 mg/dL is a Critical value-the ordering provider must be   notified)        LASTLAB(EGFR,GFRNONAA,GFRAA)@ ml/min/1.73M2   (Normal Range >60)         CT Only  If the eGFR is less than 30 the radiologist must be informed  If labs are abnormal, was the physician informed?  No   Staff s Initials SH      This procedure involves an intravenous contrast media injection.  IV started in the right hand. Good blood return.     Tolerated contrast and CT simulation fine, RTT Rosana removed IV catheter. Pt was told to drink plenty of water today.    Amy Conde

## 2021-06-01 NOTE — PROGRESS NOTES
RADIATION ONCOLOGY WEEKLY TREATMENT VISIT NOTE      Assessment:     1. 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       Impression/Plan:   49 y.o. female with incidentally found AVM, 24.8 x 10.1 x 6.7 mm nidus in the left parietal lobe, no deep venous drainage.  Speech left hemisphere dominant but 2 cm away from nidus, finger tapping anterior to dominant draining vein per fMRI. Asymptomatic. Spetzler-Alex score of 1. Received 1800 cGy/1 fx on 9/24/2019.     1. Follow up in 4-6 weeks for routine office visit.  2. MRA head w/wo contrast in 6 months with F/U.   3. Decadron 5mg taper over 5 days.   4. Call us in meantime if any concerns. Instructed to go to ER immediately if sudden change in neuro status,headache, etc     Radiation: Site: AVM  Stereotactic Radiosurgery: Yes  Stereotactic Radiosurgery date: 09/24/19  Today's Dose: 1800  Total Dose for Brain: 1800  Today's Fraction/Total Fraction Brain: 1/1      Subjective:      Radiation Treatment Summary    HISTORY: Maura Elder is a 49 y.o. female who was treated with radiation therapy for a left parietal lobe AVM.      Patient with recent diagnosis of CLL which was originally discovered through axillary adenopathy that was present on mammogram in April 2019.  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 Further evaluation with CTA had showed an AVM in the lateral superior left frontal parietal area measuring approximately 2 cm x 1 cm. Subsequent IR cerebral angiogram showed the AVM to measure 24.8 x 10.1 x 6.7 mm nidus in the left parietal lobe, no deep venous drainage. A functional MRI was obtained on 7/03/2019, Wernicke's activation approximately 1.5 - 2cm from nidus.     SITE TREATED: Left parietal AVM  TOTAL DOSE:  1  NUMBER OF FRACTIONS: 1800  DATES COMPLETED: 2019  CONCURRENT CHEMOTHERAPY: No  ADJUVANT THERAPY:No    She tolerated the treatment without unexpected side effects.       The following portions of the patient's history were reviewed and updated as appropriate: allergies, current medications, past family history, past medical history, past social history, past surgical history and problem list.    Assessment                  Body Site: Brain Site: AVM  Stereotactic Radiosurgery: Yes  Stereotactic Radiosurgery date: 19  Today's Dose: 1800  Total Dose for Brain: 1800  Today's Fraction/Total Fraction Brain:   Ocular/Visual - other : 0: Normal  Middle ear/hearin: Normal  Tinnitus: 0: No  Depressed level of consciousness: 0: Normal  Oriented x of spheres: 3  Neuropathy - motor: 0: Normal  Ataxia: 0: Normal  Speech Impairment (e.g. Dysphasia or aphasia): 0: Normal  Seizures: 0: None  Urinary Incontinence: 0: None  Bowel Incontinence: 0: None  Insomnia: 0: Normal                                            Sexuality Alteration                 Emotional Alteration Copin: Effective  Comfort Alteration KPS: 100 % Normal, no complaints  Fatigue (ONS scale) : 1: Mild Fatigue  Pain Location: denies   Nutrition Alteration Anorexia: 0: None  Nausea: 0: None  Vomitin: None  Dyspepsia and/or Heartburn: 0: None  Skin Alteration Skin Sensation: 0: No problem  Skin Reaction: 0: None  Alopecia: 0: Normal  AUA Assessment                                  Accompanied by       Objective:     Exam:     Vitals:    19 1037   BP: 115/67   Pulse: 92   Temp: 98.4  F (36.9  C)   TempSrc: Oral   SpO2: 100%   Weight: 172 lb 11.2 oz (78.3 kg)       Wt Readings from Last 8 Encounters:   19 172 lb 11.2 oz (78.3 kg)   19 172 lb (78 kg)   19 172 lb (78 kg)   19 172 lb 3.2 oz (78.1 kg)   19 175 lb (79.4 kg)   19 173 lb (78.5 kg)   19 173 lb 14.4 oz (78.9 kg)   19 174  lb (78.9 kg)       General: Alert and oriented, in no acute distress  Maura has no Erythema.    Treatment Summary to Date    Aria chart and setup information reviewed    I, Maura Giordano MD personally performed the services described in this documentation, as scribed by Sukhdev Gibson in my presence, and it is both accurate and complete.    Signed by: Maura Giordano MD, MPH

## 2021-06-02 VITALS — HEIGHT: 68 IN | WEIGHT: 173.9 LBS | BODY MASS INDEX: 26.36 KG/M2

## 2021-06-02 VITALS — WEIGHT: 175 LBS | HEIGHT: 68 IN | BODY MASS INDEX: 26.52 KG/M2

## 2021-06-02 VITALS — WEIGHT: 174 LBS | BODY MASS INDEX: 26.07 KG/M2

## 2021-06-02 VITALS — BODY MASS INDEX: 26.22 KG/M2 | WEIGHT: 177 LBS | HEIGHT: 69 IN

## 2021-06-02 VITALS — WEIGHT: 179 LBS | BODY MASS INDEX: 27.22 KG/M2

## 2021-06-02 VITALS — BODY MASS INDEX: 26.3 KG/M2 | WEIGHT: 173 LBS

## 2021-06-02 NOTE — PROGRESS NOTES
[facility] Radiation Oncology Follow Up Note    Patient: Maura Elder  MRN: [id number]  Date of Service: 10/29/2019    Assessment / Impression     1. 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  ECOG Peformance Status  ECOG Performance Status: 0     Body site: Brain    Plan:   49 y.o. female with incidentally found AVM, 24.8 x 10.1 x 6.7 mm nidus in the left parietal lobe, no deep venous drainage.  Speech left hemisphere dominant but 2 cm away from nidus, finger tapping anterior to dominant draining vein per fMRI. Asymptomatic. Spetzler-Alex score of 1. Received 1800 cGy/1 fx on 9/24/2019.     1. Radiation related side effects resolved.  2. Return in 5 months for head MRA and office visit.  3. Call us in meantime if any questions or concerns.   4. May need additional time off from work, will try full time but low threshold to cut back.     Face to face time  25 minutes with > 75% spent on consultation, education and coordination of care.    Subjective:      HPI: Maura Elder is a 49 y.o. female who was treated with radiation therapy for a left parietal lobe AVM.      Patient with recent diagnosis of CLL which was originally discovered through axillary adenopathy that was present on mammogram in April 2019.  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 Further evaluation with CTA had showed an AVM in the lateral superior left frontal parietal area measuring approximately 2 cm x 1 cm. Subsequent IR cerebral angiogram showed the AVM to measure 24.8 x 10.1 x 6.7 mm nidus in the left parietal lobe, no deep venous drainage. A functional MRI was obtained on 7/03/2019, Wernicke's activation approximately 1.5 - 2cm from nidus.      SITE TREATED: Left parietal AVM  TOTAL DOSE: 1  NUMBER OF FRACTIONS: 1800  DATES COMPLETED:  9/24/2019  CONCURRENT CHEMOTHERAPY: No  ADJUVANT THERAPY:No     She tolerated the treatment without unexpected side effects.     The patient presents today for routine office visit. Her radiation related fatigue has resolved. Some mild hair loss but otherwise has no complaints today and is doing well.     Current Outpatient Medications   Medication Sig Dispense Refill     calcium carbonate (OS-CAROLINE) 600 mg (1,500 mg) tablet Take by mouth daily. With Vitamin D             GREEN TEA EXTRACT ORAL Take 2,000 mg by mouth daily.       L.acidophilus-Bif. animalis (PROBIOTIC) 5 billion cell CpSP Take 1 capsule by mouth daily.       multivitamin with minerals (THERA-M) 9 mg iron-400 mcg Tab tablet Take 1 tablet by mouth daily.       psyllium (METAMUCIL) 3.4 gram packet Take 1 packet by mouth daily.       traZODone (DESYREL) 50 MG tablet TAKE 1 TABLET BY MOUTH EVERYDAY AT BEDTIME (Patient taking differently: TAKE 1 TABLET BY MOUTH EVERYDAY AT BEDTIME as needed) 90 tablet 3     No current facility-administered medications for this visit.        The following portions of the patient's history were reviewed and updated as appropriate: allergies, current medications, past family history, past medical history, past social history, past surgical history and problem list.    Review of Systems    General  Constitutional (WDL): All constitutional elements are within defined limits  EENT  Eye Disorder (WDL): All eye disorder elements are within defined limits  Ear Disorder (WDL): All ear disorder elements are within defined limits  Respiratory       Respiratory (WDL): Within Defined Limits  Cardiovascular  Cardiovascular (WDL): All cardiovascular elements are within defined limits  Endocrine     Gastrointestinal  Gastrointestinal (WDL): All gastrointestinal elements are within defined limits  Musculoskeletal  Musculoskeletal and Connetive Tissue Disorders (WDL): All Musculoskeletal and Connetive Tissue Disorder elements are within  defined limits  Integumentary               Integumentary (WDL): All integumentary elements are within defined limits  Neurological  Neurosensory (WDL): All neurosensory elements are within defined limits  Psychological/Emotional   Patient Coping: Accepting  Hematological/Lymphatic  Lymph (WDL): All lymph disorder elements are within defined limits  Dermatologic     Genitourinary/Reproductive  Genitourinary (WDL): All genitourinary elements are within defined limits  Reproductive     Pain              Currently in Pain: No/denies  AUA Assessment                                  Accompanied by  Accompanied by: Family Member      Objective:     Physical Exam    Vitals:    10/29/19 0948   BP: 114/68   Pulse: 86   Temp: 98  F (36.7  C)   TempSrc: Oral   SpO2: 98%   Weight: 175 lb 12.8 oz (79.7 kg)        GENERAL: no acute distress. Cooperative in conversation.   HEENT: pupils are equal, round and reactive. Oromucosa moist.  RESP: lungs are clear bilaterally per auscultation. Regular respiratory rate. No wheezes or rhonchi.  CV: Regular, rate and rhythm.   ABD: soft, nontender..   MUSCULOSKELETAL: no palpable points of tenderness   NEURO: non focal. Alert and oriented x3.   PSYCH: within normal limits. No depression or anxiety.  SKIN: warm dry intact   LYMPH: no cervical, supraclavicular lymphadenopathy  EXTREMITIES: no edema       Recent Labs: No results found for this or any previous visit (from the past 168 hour(s)).    Imaging: Imaging results 30 days: No results found.    IMaura MD personally performed the services described in this documentation, as scribed by Sukhdev Gibson in my presence, and it is both accurate and complete.    Signed by: Maura Giordano MD, MPH

## 2021-06-02 NOTE — PROGRESS NOTES
I met with Lora and her , Wilmer today in clinic.  Lora is here for follow up.  She is doing well, but having some difficulty getting back to work.  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  She did go back last Friday for a few hours, but now back full time.  I encouraged her to take it easy as much as she can and rest at home. She has much support from her  and daughter.  No needs identified for me today.

## 2021-06-02 NOTE — PROGRESS NOTES
Pt ambulatory to radiation clinic for follow up, accompanied by . Further recommendations and orders per provider.

## 2021-06-02 NOTE — TELEPHONE ENCOUNTER
Pt called to check on s/p RT, doing well. Informed to call with any questions or concerns. Reminded of follow up appointment.

## 2021-06-02 NOTE — TELEPHONE ENCOUNTER
Phone call to Lora to follow-up on our visit from 08/21/2019.     We discussed that through consult with one of my colleagues who sees patients in the [facility] and the [facility], it was recommended that Lora be referred to the specialty clinic at the [facility] for further work-up and assessment related to her history and family history of AVMs. I explained that I will work on submitting this referral, and Lora should be contacted by the specialty clinic to set up an appointment. Lora expressed understanding.    Additionally we re-addressed her family history of cancer and available genetic testing options regarding this. I shared that based on the information provided by the laboratory, Lora would not be able to submit a blood sample for analysis given her CLL diagnosis and most recentl available CBC and flow cytometry. Lora would need a skin biopsy to complete this genetic testing. We very briefly discussed the skin biopsy procedure and risks/benefits. I explained that alternatively, we could complete genetic testing for her parents (the best people to test), who are most closely related to the individuals in Lora's family with cancer histories concerning for a hereditary component (Lora's maternal grandfather with pancreatic cancer and her paternal grandmother with ovarian cancer). Lora expressed understanding and shared that testing her parents is not feasible at this time as she believes the experience would be somewhat traumatic for them. I explained that we could absolutely pursue genetic testing her Lora herself. I explained that there are some differences in cost/insurance coverage when genetic testing is completed via skin biopsy. I will reach out to the laboratory to obtain more information regarding cost and coverage. We also discussed the possibility of waiting for a more recent CBC to send along to the laboratory to see if blood testing would now be  sufficient as her most recent available CBC and flow cystometry was reported in February and May, respectively. Lora shared that she sees a provider regularly at [facility] and get regular CBCs. Her next appointment is on November 19th.     Plan:  1) I will send the referral to the Genetics Clinic at the [facility].   2) I will touch base with Lora after November 19th to discuss next steps for genetic testing for her family history of cancer once we have insurance and more recent CBC available.    All questions answered and Lora was encouraged to call if anything comes up.    Kelsey Hutchinson MS, [facility]  Licensed Genetic Counselor  [facility]  [phone]

## 2021-06-02 NOTE — TELEPHONE ENCOUNTER
Phone call to Lora to follow-up from her genetic counseling visit regarding potential genetic testing options. Patient was not able to discuss next steps/plans as she was driving at the time. Made a plan to call Lora tomorrow to have further discussion.    Kelsey Hutchinson MS, [facility]  Licensed Genetic Counselor  [facility]  [phone]

## 2021-06-03 VITALS
WEIGHT: 175.8 LBS | TEMPERATURE: 98 F | HEART RATE: 86 BPM | BODY MASS INDEX: 26.73 KG/M2 | OXYGEN SATURATION: 98 % | SYSTOLIC BLOOD PRESSURE: 114 MMHG | DIASTOLIC BLOOD PRESSURE: 68 MMHG

## 2021-06-03 VITALS — HEIGHT: 68 IN | BODY MASS INDEX: 26.07 KG/M2 | WEIGHT: 172 LBS

## 2021-06-03 VITALS
TEMPERATURE: 98.4 F | DIASTOLIC BLOOD PRESSURE: 67 MMHG | OXYGEN SATURATION: 100 % | BODY MASS INDEX: 26.26 KG/M2 | HEART RATE: 92 BPM | WEIGHT: 172.7 LBS | SYSTOLIC BLOOD PRESSURE: 115 MMHG

## 2021-06-03 VITALS — WEIGHT: 172.2 LBS | HEIGHT: 68 IN | BODY MASS INDEX: 26.1 KG/M2

## 2021-06-04 VITALS
SYSTOLIC BLOOD PRESSURE: 110 MMHG | HEART RATE: 92 BPM | WEIGHT: 169.4 LBS | OXYGEN SATURATION: 97 % | HEIGHT: 69 IN | BODY MASS INDEX: 25.09 KG/M2 | DIASTOLIC BLOOD PRESSURE: 64 MMHG

## 2021-06-05 NOTE — PROGRESS NOTES
FEMALE ADULT PREVENTIVE EXAM    CHIEF COMPLAINT:  Female preventive exam.    SUBJECTIVE:  Maura Elder is a 49 y.o. female who presents for her routine physical exam.    Patient would like to address the following concerns today:   As an update.  1) AVM: Underwent radiation with Dr. Giordano x 1.  Has MRI follow up in March.  Can take 2-5 years for it to fully resolve.     2) CLL: Met with Dr. Lombardi.  Meeting with [facility] every 6 months.      Took the month of October off of work after radiation treatment. Job is escalating in stress.  Work has just hired someone to help her who will partner with her.  Had a bad cold x 3 weeks over [place].  Has lingering phlegm in the back of the throat still and snoring.  Not a lot happens with blowing nose.  Can feel globus sensation in throat.  No sinus pressure.  Seeing therapy once a month.    Going to [facility] with friends for 50th birthday.      GYNE HISTORY  Menses: regular.  Sexually Active: with .  Contraception: condoms.  Last Pap:  normal.  Abnormal Pap:  and 2016 - [facility]IL  Vaginal Discharge: no      She  has a past medical history of AVM (arteriovenous malformation) brain, LOIS I (cervical intraepithelial neoplasia I) (), HPV (human papilloma virus) infection, Leukemia (H), and Shingles.    Lab Results   Component Value Date    WBC 16.3 (H) 2019    HGB 12.1 2019    HCT 37.0 2019    MCV 90 2019     2019     2019    K 4.0 2019    BUN 13 2019     Lab Results   Component Value Date    CHOL 229 (H) 2019    HDL 50 2019    LDLCALC 143 (H) 2019    TRIG 179 (H) 2019     Lab Results   Component Value Date    TSH 2.3 2013     BP Readings from Last 3 Encounters:   20 110/64   10/29/19 114/68   19 115/67       Surgeries:    Past Surgical History:   Procedure Laterality Date     APPENDECTOMY  2012      SECTION  2011     IR CEREBRAL  ANGIOGRAM  6/21/2019       Family History:  Her family history includes Arthritis in her mother; Cancer in her paternal grandmother; No Medical Problems in her daughter, father, maternal aunt, maternal grandmother, paternal aunt, paternal grandfather, and sister; Pancreatic cancer in her maternal grandfather; Pulmonary embolism (age of onset: 73) in her mother.    Social History:  She  reports that she has never smoked. She has never used smokeless tobacco. She reports current alcohol use of about 1.0 standard drinks of alcohol per week. She reports that she does not use drugs.    Medications:    Current Outpatient Medications:      calcium carbonate (OS-CAROLINE) 600 mg (1,500 mg) tablet, Take by mouth daily. With Vitamin D   , Disp: , Rfl:      GREEN TEA EXTRACT ORAL, Take 2,000 mg by mouth daily., Disp: , Rfl:      L.acidophilus-Bif. animalis (PROBIOTIC) 5 billion cell CpSP, Take 1 capsule by mouth daily., Disp: , Rfl:      multivitamin with minerals (THERA-M) 9 mg iron-400 mcg Tab tablet, Take 1 tablet by mouth daily., Disp: , Rfl:      psyllium (METAMUCIL) 3.4 gram packet, Take 1 packet by mouth daily., Disp: , Rfl:      traZODone (DESYREL) 50 MG tablet, TAKE 1 TABLET BY MOUTH EVERYDAY AT BEDTIME (Patient taking differently: TAKE 1 TABLET BY MOUTH EVERYDAY AT BEDTIME as needed), Disp: 90 tablet, Rfl: 3     doxycycline (VIBRA-TABS) 100 MG tablet, Take 1 tablet (100 mg total) by mouth 2 (two) times a day for 10 days., Disp: 20 tablet, Rfl: 0  HELD MEDICATIONS: None.    Allergies:  No latex allergies.  Allergies   Allergen Reactions     Penicillins Rash            RISK BEHAVIOR & HEALTH HABITS  Self Breast Exam: yes  Regular Exercise: Lower energy.    Balanced diet:   Seat Belt Use: yes  Calcium intake/Osteoporosis prevention: Taking Calcium and magnesium and Vitamin D.  Taking a green tea supplement.    Dexa: not due.  Colonoscopy: referred.  Mammogram: up to date.    Guns: NO  Sun Screen: YES  Dental Care:  YES    REVIEW OF SYSTEMS:  Complete head to toe review of systems is otherwise negative except as above.    OBJECTIVE:  VITAL SIGNS:    Vitals:    02/07/20 1034   BP: 110/64   Pulse: 92   SpO2: 97%     GENERAL:  Patient alert, in no acute distress.  EYES: PERRLA. Extraoccular movements intact, pupils equal, reactive to light and accommodation.  Normal conjunctiva and lids.  Undilated fudoscopic exam normal, including normal size, appearance cup-to-disc ratio.  Normal posterior segments, including no exudates or hemorrhages.  ENT:  Hearing grossly normal.  Normal appearance to ears and nose.  Bilateral TM s, external canals, oropharynx normal. Normal lips, gums and teeth.  Normal nasal mucosa, septum and turbinates.  NECK:  Supple, without thyromegaly or mass.  RESP:  Clear to auscultation without crackles, wheezes or distress.  Normal respiratory effort.   CV:  Regular rate and rhythm without murmurs, rubs or gallops.  Normal carotid, abdominal aorta, femoral and pedal pulses.  No varicosities or edema.  ABDOMEN:  Soft, non-tender, without hepatosplenomegaly, masses, or hernias.   BREASTS:  Nontender, without masses, nipple discharge, erythema, or axillary adenopathy.    :    External genitalia:  Normal without lesions.  Urethra: Normal appearing  Vagina: Normal without discharge  Cervix: normal  Uterus:  Nontender, mobile, without masses  Ovaries: Normal without masses  LYMPHATIC: Normal palpation of neck, groin and axilla..  No lymphadenopathy.  No bruising.  NEURO:  CN II-XII intact, motor & sensory function all intact.  DTR and reflexes normal.  PSYCHIATRIC:  Alert & oriented with normal mood and affect.  Good judgment and insight.  SKIN:  Normal inspection and palpation.  MUSCULOSKELETAL: Normal gait and station.  - Spine / Ribs / Pelvis: Normal inspection, ROM, stability and strength: Spine, Head, Neck, Upper and Lower Extremities.    ASSESSMENT & PLAN  Maura was seen today for annual exam.    Diagnoses  and all orders for this visit:    Routine general medical examination at a health care facility  -     Gynecologic Cytology (PAP Smear)  -     Ambulatory referral for Colonoscopy  -     HPV High Risk DNA Cervical  Patient is leading a healthy lifestyle.    She has a lot of diagnoses to attend to.  Reviewed family history for high risk screening.    Acute sinusitis with symptoms > 10 days  -     doxycycline (VIBRA-TABS) 100 MG tablet; Take 1 tablet (100 mg total) by mouth 2 (two) times a day for 10 days.    PAtient has had cold symptoms for 1 month.  Discussed instructions for use and potential side effects of medication.

## 2021-06-06 NOTE — TELEPHONE ENCOUNTER
Patient came in for a physical on 02/07/20. Patient was going to schedule a future appointment for labs when she is fasting.     Will forward to PCP to place orders.    TL/CLARISSE

## 2021-06-06 NOTE — TELEPHONE ENCOUNTER
Spoke with patient.  I had referred her to Dr. Clemente for benign cells in pap smear over the age of 45.  However, Dr. Clemente noted that if she had her menses, then no further workup is needed as that would cause endometrial cells to be in pap.  Relayed this to the patient.  Will cancel referral.

## 2021-06-10 NOTE — PROGRESS NOTES
"[facility] Radiation Oncology Follow Up Note    Patient: Maura Elder  MRN: [id number]  Date of Service: 08/11/2020  Assessment:     1. 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 female with incidentally found AVM, 24.8 x 10.1 x 6.7 mm nidus in the left parietal lobe, no deep venous drainage.  Speech left hemisphere dominant but 2 cm away from nidus, finger tapping anterior to dominant draining vein per fMRI. Asymptomatic. Spetzler-Alex score of 1. Received 1800 cGy/1 fx on 9/24/2019.     1. MR head COW 7/30/2020 reviewed. Decrease in left parietal AVM, previously measuring 9.9 x 10.6 mm, now 7.8 x 8.0 cm, and dominant draining vein measuring 2.8 mm in diameter, previously 5 mm. Good response to radiation treatment. Imaging results relayed to patient.   2. Follow up in 6 months for repeat head MR brain COW.  3. Neurologically stable, has occasional peripheral scalp nerve \"twinges\"     Subjective:   Maura Elder is a 49 y.o. female who is being evaluated via a billable video visit.      The patient has been notified of following:     \"This video visit will be conducted via a call between you and your physician/provider. We have found that certain health care needs can be provided without the need for an in-person physical exam.  This service lets us provide the care you need with a video conversation.  If a prescription is necessary we can send it directly to your pharmacy.  If lab work is needed we can place an order for that and you can then stop by our lab to have the test done at a later time.    Video visits are billed at different rates depending on your insurance coverage. Please reach out to your insurance provider with any questions.    If during the course of the call the physician/provider feels a video visit is not appropriate, you will not be charged for this service.\"    Patient has given verbal consent to a Video visit? Yes  How " would you like to obtain your AVS? AVS Preference: [place].        Video Start Time: 10:05am      HPI: Maura Elder is a 49 y.o. female who was treated with radiation therapy for a left parietal lobe AVM.      Patient with recent diagnosis of CLL which was originally discovered through axillary adenopathy that was present on mammogram in April 2019.  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 Further evaluation with CTA had showed an AVM in the lateral superior left frontal parietal area measuring approximately 2 cm x 1 cm. Subsequent IR cerebral angiogram showed the AVM to measure 24.8 x 10.1 x 6.7 mm nidus in the left parietal lobe, no deep venous drainage. A functional MRI was obtained on 7/03/2019, Wernicke's activation approximately 1.5 - 2cm from nidus.      SITE TREATED: Left parietal AVM  TOTAL DOSE: 1  NUMBER OF FRACTIONS: 1800  DATES COMPLETED: 9/24/2019  CONCURRENT CHEMOTHERAPY: No  ADJUVANT THERAPY:No     She tolerated the treatment without unexpected side effects.     The patient was contacted today via video chat for routine follow up and imaging results. The patient denies any new neuro symptoms since our last visit and is doing well. No complaints at this time.     Imaging: Personally reviewed.   EXAM: MR BRAIN [place] OF [place] WITHOUT AND WITH CONTRAST  LOCATION: [facility]  DATE/TIME: 07/30/2020, 8:52 AM     INDICATION: Left parietal arteriovenous malformation. Status post radiation therapy 09/24/2019.  COMPARISON: Treatment planning head MRI and head CTA examinations 09/09/2019.  CONTRAST: Gadavist 7.5 mL.  TECHNIQUE:   HEAD MRI: Routine multiplanar multisequence head MRI without and with intravenous contrast.  HEAD MRA: 3D time-of-flight head MRA without intravenous contrast.     FINDINGS:  HEAD MRI:  INTRACRANIAL CONTENTS: No acute or subacute infarct. Left parietal arteriovenous malformation  again visualized. Feeding vessels from the left MCA territory are identified. Dominant drainage is to overlying cortical veins and then to the superior sagittal   sinus. The dominant draining vein demonstrates a 7.8 x 8.0 mm focus of aneurysmal dilatation. This measured 9.9 x 10.6 mm on 09/09/2019. Distal to this aneurysm, the dominant draining vein measures 2.8 mm in diameter, previously 5 mm. The vascular nidus   measures approximately 8.9 x 6.2 mm in AP by transverse dimensions, previously 10.1 x 7.0 mm.     Subtle T2 prolongation in the subcortical left superior parietal lobule consistent with posttreatment change. Intra-axial signal is otherwise normal. Normal ventricles.     SELLA: No abnormality accounting for technique.     OSSEOUS STRUCTURES/SOFT TISSUES: Normal marrow signal. 12.2 x 9.3 mm extradural left cavernous carotid artery aneurysm. This has not significantly changed when accounting for differences in image acquisition.      ORBITS: No abnormality accounting for technique.      SINUSES/MASTOIDS: No paranasal sinus mucosal disease. No middle ear or mastoid effusion.      HEAD MRA:   ANTERIOR CIRCULATION: 12.2 x 9.3 mm extradural left cavernous carotid aneurysm. Left parietal AVM. Associated 7.8 x 8.0 mm venous aneurysm. The dominant draining vein distal to this aneurysm measures 2.8 mm in diameter. AVM inflow is from the left MCA   branches. The anterior circulation is otherwise normal.     POSTERIOR CIRCULATION: No stenosis/occlusion, aneurysm, or high-flow vascular malformation. Balanced vertebral arteries supply a normal basilar artery.      IMPRESSION:   HEAD MRI:   1.  Regressing left parietal AVM.  2.  Minimal T2 prolongation in the left superior parietal lobule correlating with posttreatment change.  3.  Stable left cavernous carotid artery aneurysm.     HEAD MRA:  1.  Left parietal AVM.  2.  7.8 x 8.0 mm venous aneurysm previously measuring 9.9 x 10.6 mm.  3.  Dominant draining vein measuring  2.8 mm in diameter, previously 5 mm.  4.  When accounting for differences in image acquisition, the 12.2 x 9.3 mm extradural left cavernous carotid artery aneurysm is stable.    Video-Visit Details    Type of service:  Video Visit    Video End Time (time video stopped): 10:47am  Originating Location (pt. Location): Home    Distant Location (provider location):  [facility]     Platform used for Video Visit: Maura Li MD personally performed the services described in this documentation, as scribed by Sukhdev Gibson in my presence, and it is both accurate and complete.    Signed by: Maura Giordano MD, MPH

## 2021-06-10 NOTE — PROGRESS NOTES
"Maura Elder is a 49 y.o. female who is being evaluated via a billable video visit.      The patient has been notified of following:     \"This video visit will be conducted via a call between you and your physician/provider. We have found that certain health care needs can be provided without the need for an in-person physical exam.  This service lets us provide the care you need with a video conversation.  If a prescription is necessary we can send it directly to your pharmacy.  If lab work is needed we can place an order for that and you can then stop by our lab to have the test done at a later time.    Video visits are billed at different rates depending on your insurance coverage. Please reach out to your insurance provider with any questions.    If during the course of the call the physician/provider feels a video visit is not appropriate, you will not be charged for this service.\"    Patient has given verbal consent to a Video visit? Yes  How would you like to obtain your AVS? AVS Preference: MyChart.  If dropped by the video visit, the video invitation should be sent to: Text to cell phone: [phone]  Will anyone else be joining your video visit? No      Gets twinges of pain in her head at times, no real headaches. No other concerns.   -Amy Conde RN, OCN, Radiation Oncology Nurse    Video-Visit Details    Type of service:  Video Visit  See MD separate note.  "

## 2021-06-12 NOTE — PROGRESS NOTES
Assessment and Plan    1. Acute bronchitis/2. Abnormal lung sounds  She has an ongoing cough for two weeks, not getting better, but no other symptoms.  Faint rales in left lower quadrant.  Pneumonia unlikely given symptoms. Will treat presumptively, but I'd like her to come back for another lung examination in 2 weeks - if rales persist we will do a CXR at that time  - azithromycin (ZITHROMAX Z-ULYSSES) 250 MG tablet; Take 2 tablets (500 mg) on  Day 1,  followed by 1 tablet (250 mg) once daily on Days 2 through 5.  Dispense: 6 tablet; Refill: 0    Arcelia Olivera MD     -------------------------------------------    Chief Complaint   Patient presents with     Cough     x 2-3 week, dry cough, will have a gag reflux sometimes, no fever, wheezing or rattling.  Nyquil works at night.  Tried other OTC medications.  Daughter is also sick.      Maura as no fever, just a weird little non- productive cough, no paroxysms of coughing.  No dyspnea. Cough is getting a little heaver - turning more into cold. No sore throat. No nausea or abdominal pain.  Her daughter has a similar cough (she thinks she gave it to her daughter)      Patient Active Problem List   Diagnosis     Bloating (Symptom)     Cyst On The Right Ovary     Nontoxic Autonomous Thyroid Nodule     Tinnitus         Current Outpatient Prescriptions:      calcium carbonate (OS-CAROLINE) 600 mg (1,500 mg) tablet, Take 600 mg by mouth 2 (two) times a day with meals., Disp: , Rfl:      ERGOCALCIFEROL, VITAMIN D2, (VITAMIN D2 ORAL), Take by mouth., Disp: , Rfl:      L.acidophilus-Bif. animalis (PROBIOTIC) 5 billion cell CpSP, Take 1 capsule by mouth daily., Disp: , Rfl:      multivitamin with minerals (THERA-M) 9 mg iron-400 mcg Tab tablet, Take 1 tablet by mouth daily., Disp: , Rfl:      azithromycin (ZITHROMAX Z-ULYSSES) 250 MG tablet, Take 2 tablets (500 mg) on  Day 1,  followed by 1 tablet (250 mg) once daily on Days 2 through 5., Disp: 6 tablet, Rfl: 0      History   Smoking  Status     Never Smoker   Smokeless Tobacco     Never Used       History   Alcohol Use     1.2 oz/week     2 Glasses of wine per week     Comment: glass a of wine twice a week       Vitals:    08/01/17 1521   BP: 108/52   Pulse: 74   Resp: 16   Temp: 98.2  F (36.8  C)   SpO2: 99%     Body mass index is 26 kg/(m^2).     EXAM:    General appearance - alert, well appearing, and in no distress  Eyes - conjunctiva clear  Ears - bilateral TM's and external ear canals normal  Mouth - mucous membranes moist, pharynx normal without lesions  Neck - single right anterior lymph node enlarge to 1 cm, otherwise normal  Chest - faint mild rales left lower lobe, otherwise normal  Heart - normal rate, regular rhythm, normal S1, S2, no murmurs, rubs, clicks or gallops

## 2021-06-12 NOTE — PROGRESS NOTES
Assessment and Plan    1. Acute bronchitis, unspecified organism  RESOLVED - normal lung sounds - reassured    Arcelia Olivera MD     -------------------------------------------    Chief Complaint   Patient presents with     Follow-up     cough. No rattling.     I saw Maura on 8/1/17 for a continuing cough.  She had some faint rales at the left base, and I elected to treat her presumptively with azithromycin to cover bronchitis vs. Pneumonia, and to have her come back for a recheck - this is why she is here today.  The plan is to do an xray if the abnormal sounds persist.    She says that now her cough is gone for the most part - she only has an occasional 'normal' cough, and she has no shortness or breath or fever.  She also notes some chronic mild allergy symptoms       Patient Active Problem List   Diagnosis     Bloating (Symptom)     Cyst On The Right Ovary     Nontoxic Autonomous Thyroid Nodule     Tinnitus         Current Outpatient Prescriptions:      calcium carbonate (OS-CAROLINE) 600 mg (1,500 mg) tablet, Take 600 mg by mouth 2 (two) times a day with meals., Disp: , Rfl:      ERGOCALCIFEROL, VITAMIN D2, (VITAMIN D2 ORAL), Take by mouth., Disp: , Rfl:      L.acidophilus-Bif. animalis (PROBIOTIC) 5 billion cell CpSP, Take 1 capsule by mouth daily., Disp: , Rfl:      multivitamin with minerals (THERA-M) 9 mg iron-400 mcg Tab tablet, Take 1 tablet by mouth daily., Disp: , Rfl:       History   Smoking Status     Never Smoker   Smokeless Tobacco     Never Used       History   Alcohol Use     1.2 oz/week     2 Glasses of wine per week     Comment: glass a of wine twice a week       Vitals:    08/24/17 0950   BP: 98/52   Pulse: 71   Resp: 16   Temp: 98.1  F (36.7  C)   SpO2: 98%     Body mass index is 26.3 kg/(m^2).     EXAM:    General appearance - alert, well appearing, and in no distress  Chest - clear to auscultation, no wheezes, rales or rhonchi, symmetric air entry

## 2021-06-14 NOTE — PROGRESS NOTES
.  FEMALE ADULT PREVENTIVE EXAM    CHIEF COMPLAINT:  Female preventive exam.    SUBJECTIVE:  Maura Elder is a 47 y.o. female who presents for her routine physical exam.    Patient would like to address the following concerns today:     Gets boils about once a year in the groin area.  Discussed seborrheic dermatitis.  Discussed symptomatic management.      Stress at work.  Ever present.  Not enough people to do the work.      Hand tingling:  Worse with computer work.  On rare occasions, will happen when sleeping.  Mother has arthritis.  Discussed signs and symptoms of carpal tunnel.  It appears to be affecting her whole hand, which can be due to leaning on armrests.  She will get a workspace evaluation at work.      GYNE HISTORY  Menses: regular  Sexually Active: with   Contraception: condoms  Last Pap: 11/2016 - LSIL + HPV  Abnormal Pap: per above.  Colposcopy revealed CIN1.  Recheck one year.  Vaginal Discharge: none      She  has a past medical history of LOIS I (cervical intraepithelial neoplasia I) (2015).    Lab Results   Component Value Date    WBC 10.5 12/20/2012    HGB 11.8 (L) 12/20/2012    HCT 34.0 (L) 12/20/2012    MCV 91 12/20/2012     12/20/2012     12/20/2012     12/20/2012    K 3.8 12/20/2012    BUN 6 (L) 12/20/2012     Lab Results   Component Value Date    CHOL 222 (H) 12/21/2016    HDL 45 (L) 12/21/2016    LDLCALC 143 (H) 12/21/2016    TRIG 172 (H) 12/21/2016     Lab Results   Component Value Date    TSH 2.3 09/18/2013     BP Readings from Last 3 Encounters:   12/22/17 (!) 88/54   08/24/17 98/52   08/01/17 108/52       Surgeries:    Past Surgical History:   Procedure Laterality Date     NO PAST SURGERIES         Family History:  Her family history includes Arthritis in her mother; Cancer in her paternal grandmother; No Medical Problems in her daughter, father, maternal aunt, maternal grandmother, paternal aunt, paternal grandfather, and sister; Pancreatic cancer  in her maternal grandfather. There is no history of BRCA 1/2, Breast cancer, Colon cancer, Endometrial cancer, Ovarian cancer, or Clotting disorder.    Social History:  She  reports that she has never smoked. She has never used smokeless tobacco. She reports that she drinks about 1.2 oz of alcohol per week  She reports that she does not use illicit drugs.    Medications:    Current Outpatient Prescriptions:      calcium carbonate (OS-CAROLINE) 600 mg (1,500 mg) tablet, Take 600 mg by mouth 2 (two) times a day with meals., Disp: , Rfl:      ERGOCALCIFEROL, VITAMIN D2, (VITAMIN D2 ORAL), Take by mouth., Disp: , Rfl:      L.acidophilus-Bif. animalis (PROBIOTIC) 5 billion cell CpSP, Take 1 capsule by mouth daily., Disp: , Rfl:      multivitamin with minerals (THERA-M) 9 mg iron-400 mcg Tab tablet, Take 1 tablet by mouth daily., Disp: , Rfl:   HELD MEDICATIONS: None.    Allergies:  No latex allergies.  Allergies   Allergen Reactions     Penicillins Rash            RISK BEHAVIOR & HEALTH HABITS  Self Breast Exam: yes.  Regular Exercise: yes.  Balanced diet: yes.  Seat Belt Use: yes  Calcium intake/Osteoporosis prevention: in diet and supplements.  Dexa: not due  Colonoscopy:not due  Mammogram: 2016 - normal    Guns: NO  Sun Screen: YES  Dental Care: YES    REVIEW OF SYSTEMS:  Complete head to toe review of systems is otherwise negative except as above.    OBJECTIVE:  VITAL SIGNS:    Vitals:    12/22/17 1045   BP: (!) 88/54   Pulse: 66   SpO2: 98%     GENERAL:  Patient alert, in no acute distress.  EYES: PERRLA. Extraoccular movements intact, pupils equal, reactive to light and accommodation.  Normal conjunctiva and lids.  Undilated fudoscopic exam normal, including normal size, appearance cup-to-disc ratio.  Normal posterior segments, including no exudates or hemorrhages.  ENT:  Hearing grossly normal.  Normal appearance to ears and nose.  Bilateral TM s, external canals, oropharynx normal. Normal lips, gums and teeth.  Normal  nasal mucosa, septum and turbinates.  NECK:  Supple, without thyromegaly or mass.  RESP:  Clear to auscultation without crackles, wheezes or distress.  Normal respiratory effort.   CV:  Regular rate and rhythm without murmurs, rubs or gallops.  Normal carotid, abdominal aorta, femoral and pedal pulses.  No varicosities or edema.  ABDOMEN:  Soft, non-tender, without hepatosplenomegaly, masses, or hernias.   BREASTS:  Nontender, without masses, nipple discharge, erythema, or axillary adenopathy.    :    External genitalia:  Normal without lesions.  Urethra: Normal appearing  Vagina: Normal without discharge  Cervix: normal appearing.  Uterus:  Nontender, mobile, without masses  Ovaries: Normal without masses  LYMPHATIC: Normal palpation of neck, groin and axilla..  No lymphadenopathy.  No bruising.  NEURO:  CN II-XII intact, motor & sensory function all intact.  DTR and reflexes normal.  PSYCHIATRIC:  Alert & oriented with normal mood and affect.  Good judgment and insight.  SKIN:  Normal inspection and palpation.  Groin area shows resolving cyst. No active abscess.  MUSCULOSKELETAL: Normal gait and station.  - Spine / Ribs / Pelvis: Normal inspection, ROM, stability and strength: Spine, Head, Neck, Upper and Lower Extremities.    ASSESSMENT & PLAN  Maura was seen today for annual exam, skin problem and tingling.    Diagnoses and all orders for this visit:    Routine general medical examination at a health care facility  -     Gynecologic Cytology (PAP Smear)  -     Tdap vaccine,  8yo or older,  IM  -     HPV High Risk DNA Cervical   Patient had CIN1 at last colposcopy.  Due for pap and HPV today.  Recommend Tdap today.  Discussed healthy lifestyle.  She is working on more regular exercise.  Reviewed family history for high risk screening.

## 2021-06-15 NOTE — PROGRESS NOTES
"[facility] Radiation Oncology Follow Up Note    Patient: Maura Elder  MRN: [id number]  Date of Service: 02/16/2021    Assessment:     1. 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 female with incidentally found AVM, 24.8 x 10.1 x 6.7 mm nidus in the left parietal lobe, no deep venous drainage.  Speech left hemisphere dominant but 2 cm away from nidus, finger tapping anterior to dominant draining vein per fMRI. Asymptomatic. Spetzler-Alex score of 1. Received 1800 cGy/1 fx on 9/24/2019.     1. MR head COW 2/11/2021 personally reviewed. Decrease in left parietal AVM, previously measuring 16 x 20 x 22, now 10 x 12 x 10 mm. Consistent with response to radiation treatment. Left ICA aneurysm unchanged.  Imaging results relayed to patient.     2. Follow up in 6 months for repeat MR COW w/wo contrast.    3. Continue follow up with medical oncology for CLL     4. Will continue to watch left ICA aneurysm.Currently stable     5. From radiation standpoint clear for all vaccines including Covid vaccine, [place] Department of Health for further information.     Subjective:   Maura Elder is a 50 y.o. female who is being evaluated via a billable video visit.       The patient has been notified of following:      \"This video visit will be conducted via a call between you and your physician/provider. We have found that certain health care needs can be provided without the need for an in-person physical exam.  This service lets us provide the care you need with a video conversation.  If a prescription is necessary we can send it directly to your pharmacy.  If lab work is needed we can place an order for that and you can then stop by our lab to have the test done at a later time.     Video visits are billed at different rates depending on your insurance coverage. Please reach out to your insurance provider " "with any questions.     If during the course of the call the physician/provider feels a video visit is not appropriate, you will not be charged for this service.\"     Patient has given verbal consent to a Video visit? Yes  How would you like to obtain your AVS? AVS Preference: Amanda.       HPI: Maura Elder is a 50 y.o. female who was treated with radiation therapy for a left parietal lobe AVM.      Patient with recent diagnosis of CLL which was originally discovered through axillary adenopathy that was present on mammogram in 2019.  Further work up included CT soft tissue neck on 2019 which incidentally discovered a 8 x 9 mm cavernous ICA aneurysm. Further evaluation with CTA had showed an AVM in the lateral superior left frontal parietal area measuring approximately 2 cm x 1 cm. Subsequent IR cerebral angiogram showed the AVM to measure 24.8 x 10.1 x 6.7 mm nidus in the left parietal lobe, no deep venous drainage. A functional MRI was obtained on 2019, Wernicke's activation approximately 1.5 - 2cm from nidus.      SITE TREATED: Left parietal AVM  TOTAL DOSE: 1  NUMBER OF FRACTIONS: 1800  DATES COMPLETED: 2019  CONCURRENT CHEMOTHERAPY: No  ADJUVANT THERAPY:No     She tolerated the treatment without unexpected side effects.     The patient was contacted via video for routine follow up. She is doing well. Her CLL is currently being watched my [place] [facility]. She has no complaints at this time.     Past Medical History:   Diagnosis Date     AVM (arteriovenous malformation) brain      LOIS I (cervical intraepithelial neoplasia I)      HPV (human papilloma virus) infection      Leukemia (H)     CLL     Shingles      Past Surgical History:   Procedure Laterality Date     APPENDECTOMY  2012      SECTION  2011     IR CEREBRAL ANGIOGRAM  2019     Current Outpatient Medications on File Prior to Visit   Medication Sig Dispense Refill     calcium carbonate (OS-CAROLINE) 600 mg (1,500 " mg) tablet Take by mouth daily. With Vitamin D             cholecalciferol, vitamin D3, 1,000 unit (25 mcg) tablet Take 1,000 Units by mouth daily.       GREEN TEA EXTRACT ORAL Take 2,000 mg by mouth daily.       L.acidophilus-Bif. animalis (PROBIOTIC) 5 billion cell CpSP Take 1 capsule by mouth daily.       multivitamin with minerals (THERA-M) 9 mg iron-400 mcg Tab tablet Take 1 tablet by mouth daily.       psyllium (METAMUCIL) 3.4 gram packet Take 1 packet by mouth daily as needed.        [DISCONTINUED] traZODone (DESYREL) 50 MG tablet TAKE 1 TABLET BY MOUTH EVERYDAY AT BEDTIME (Patient taking differently: TAKE 1 TABLET BY MOUTH EVERYDAY AT BEDTIME as needed) 90 tablet 3     No current facility-administered medications on file prior to visit.      Penicillins  Social History     Socioeconomic History     Marital status:      Spouse name: Not on file     Number of children: Not on file     Years of education: Not on file     Highest education level: Not on file   Occupational History     Not on file   Social Needs     Financial resource strain: Not on file     Food insecurity     Worry: Not on file     Inability: Not on file     Transportation needs     Medical: Not on file     Non-medical: Not on file   Tobacco Use     Smoking status: Never Smoker     Smokeless tobacco: Never Used   Substance and Sexual Activity     Alcohol use: Yes     Alcohol/week: 1.0 standard drinks     Types: 1 Glasses of wine per week     Drug use: No     Sexual activity: Never     Partners: Male   Lifestyle     Physical activity     Days per week: Not on file     Minutes per session: Not on file     Stress: Not on file   Relationships     Social connections     Talks on phone: Not on file     Gets together: Not on file     Attends Jainism service: Not on file     Active member of club or organization: Not on file     Attends meetings of clubs or organizations: Not on file     Relationship status: Not on file     Intimate partner  violence     Fear of current or ex partner: Not on file     Emotionally abused: Not on file     Physically abused: Not on file     Forced sexual activity: Not on file   Other Topics Concern     Not on file   Social History Narrative     Not on file       ROS:  Reviewed with Maura grover.    General  Constitutional (WDL): All constitutional elements are within defined limits  EENT  Eye Disorder (WDL): All eye disorder elements are within defined limits  Ear Disorder (WDL): All ear disorder elements are within defined limits  Respiratory       Respiratory (WDL): Within Defined Limits  Cardiovascular  Cardiovascular (WDL): All cardiovascular elements are within defined limits  Endocrine     Gastrointestinal  Gastrointestinal (WDL): All gastrointestinal elements are within defined limits  Musculoskeletal  Musculoskeletal and Connetive Tissue Disorders (WDL): All Musculoskeletal and Connetive Tissue Disorder elements are within defined limits  Integumentary               Integumentary (WDL): All integumentary elements are within defined limits  Neurological  Neurosensory (WDL): All neurosensory elements are within defined limits  Psychological/Emotional   Patient Coping: Accepting  Hematological/Lymphatic  Lymph (WDL): All lymph disorder elements are within defined limits  Dermatologic     Genitourinary/Reproductive  Genitourinary (WDL): All genitourinary elements are within defined limits  Reproductive     Pain              Currently in Pain: No/denies  Pain Score (Initial OR Reassessment): 0  Accompanied by  Accompanied by: Alone      Objective:        Exam:  There were no vitals filed for this visit.    General: Alert and oriented. Sitting comfortably.   Patient non acute appearing, asymptomatic. No cough, no respiratory distress.   Neurologic: Grossly normal  Psych: affect normal, thought content appropriate.     Recent Labs: No results found for this or any previous visit (from the past 168  hour(s)).    Imaging: Imaging results 30 days: Mr Brain Cow With Without Contrast    Result Date: 2/12/2021  EXAM: MR BRAIN COW W WO CONTRAST LOCATION: [facility] DATE/TIME: 2/11/2021 8:53 PM INDICATION: Left parietal arteriovenous malformation. Status post radiation therapy 09/24/2019. COMPARISON: Head MRI 09/09/2019 CONTRAST: Gadavist 7.5ml TECHNIQUE: 1) Routine multiplanar multisequence head MRI without and with intravenous contrast. 2) 3D time-of-flight head MRA without intravenous contrast. FINDINGS: HEAD MRI: INTRACRANIAL CONTENTS: No acute or subacute infarct. No mass, acute hemorrhage, or extra-axial fluid collections. Small nidus of abnormal vascular type enhancement seen along the anterior left parietal convexity without significant interval decrease in the size of vascular flow voids and associated vascular type enhancement compared to the previous exam. A small vascular nidus in this location now measures approximately 10 x 12 x 10 mm in transverse, AP, and craniocaudal dimensions respectively on postcontrast images compared to 16 x 20 x 22 mm in a similar fashion previously. Prominent cortical veins seen in this region previously have decreased in size there may be a small venous aneurysm in this location that persists. Minor juxtacortical gliosis is now seen in this location on FLAIR images, potentially on a post therapeutic basis. Rare nonspecific T2/FLAIR hyperintensities within the cerebral white matter elsewhere without additional new parenchymal signal abnormality. Unchanged brain parenchymal volume and ventricular size. No hydrocephalus. Mild cerebellar tonsillar ectopia not meeting criteria for Chiari I malformation with only partial effacement of the CSF in the foramen magnum. No new foci of pathologic enhancement identified. SELLA: No abnormality accounting for technique. OSSEOUS STRUCTURES/SOFT TISSUES: Normal marrow signal. Major and cranial vascular flow voids at  the skull base are maintained. Numerous enlarged lymph nodes are seen within the periparotid space and upper neck corresponding to findings on a soft tissue neck CT from 05/16/2019 and consistent with the patient's known history of CLL. ORBITS: No abnormality accounting for technique. SINUSES/MASTOIDS: No paranasal sinus mucosal disease. No middle ear or mastoid effusion. HEAD MRA: ANTERIOR CIRCULATION: Unchanged 10 x 13 mm cavernous left ICA aneurysm. Significant decrease in flow related signal associated with the known left parietal AVM with decreasing size of enlarged cortical veins in this area consistent with findings on postcontrast imaging. Distal left M for feeding arterial branches have also decreased in caliber. No new aneurysm or high flow vascular malformation identified elsewhere. Standard [place] of [place] anatomy. POSTERIOR CIRCULATION: No stenosis/occlusion, aneurysm, or high flow vascular malformation. Balanced vertebral arteries supply a normal basilar artery.     HEAD MRI: 1.  AVM along the surface of the left parietal lobe with interval decrease in the size of the vascular nidus and decreasing caliber of adjacent arterial and venous branches over the surface of the left parietal convexity compared to 09/09/2019. This is consistent with some response to interval therapy. A small vascular nidus in this location persists. 2.  Minor gliosis within the adjacent left parietal brain parenchyma is new and may be post therapeutic in nature. 3.  No superimposed acute intracranial process. 4.  Numerous enlarged cervical lymph nodes are partially visualized. This is consistent with the patient's known history of CLL. HEAD MRA: 1.  Decreasing size of the left parietal AVM with associated decrease in caliber of both feeding arteries and draining venous branches in this area compared to 09/09/2019. 2.  Unchanged cavernous left ICA aneurysm. 3.  No new aneurysm, high flow vascular malformation, or high-grade  stenosis identified.    Video-Visit Details     Type of service:  Video Visit     Video Start Time: 11:45AM  Video End Time (time video stopped): 12:03AM  Originating Location (pt. Location): Home     Distant Location (provider location):  [facility]      Platform used for Video Visit: [facility]      Total time 35 minutes spent on the date of the encounter doing chart review, review of test results, interpretation of tests, patient visit and documentation Personal review of MRI dated 2/11/21;9/9/2019; 7/30/2019, Angiogram 6/2019,       IMaura MD personally performed the services described in this documentation, as scribed by Sukhdev Gibson in my presence, and it is both accurate and complete.    Signed by: Maura Giordano MD, MPH

## 2021-06-15 NOTE — PROGRESS NOTES
Maura Elder is a 50 y.o. female who is being evaluated via a billable video visit.      How would you like to obtain your AVS? [employer].  If dropped from the video visit, the video invitation should be resent by: Text to cell phone: [phone]  Will anyone else be joining your video visit? No      Platform used for Video Visit: Jaron through [employer]    Nursing Note: Reached pt via phone prior to video visit with Dr. Giordano, chart updated. She is feeling well and has zero concerns. Recent MRI report in chart.     Amy Conde RN, OCN  Radiation Oncology

## 2021-06-16 PROBLEM — I72.0 ANEURYSM OF CAROTID ARTERY (H): Status: ACTIVE | Noted: 2019-05-24

## 2021-06-16 PROBLEM — Q28.2 AVM (ARTERIOVENOUS MALFORMATION) BRAIN: Status: ACTIVE | Noted: 2019-07-17

## 2021-06-16 PROBLEM — C91.10 CLL (CHRONIC LYMPHOCYTIC LEUKEMIA) (H): Status: ACTIVE | Noted: 2019-05-07

## 2021-06-16 PROBLEM — E78.5 DYSLIPIDEMIA: Status: ACTIVE | Noted: 2019-02-06

## 2021-06-16 NOTE — TELEPHONE ENCOUNTER
Telephone Encounter by Catherine Lay at 1/16/2019 11:53 AM     Author: Catherine Lay Service: -- Author Type: --    Filed: 1/16/2019 11:53 AM Encounter Date: 1/15/2019 Status: Signed    : Catherine Lay       [facility] team  [phone]    PA has been initiated.

## 2021-06-16 NOTE — TELEPHONE ENCOUNTER
Telephone Encounter by Catherine Lay at 1/16/2019 11:54 AM     Author: Catherine Lay Service: -- Author Type: --    Filed: 1/16/2019 11:54 AM Encounter Date: 1/15/2019 Status: Signed    : Catherine Lay APPROVED:    Approval start date: 12/17/18   Approval end date: 1/16/2020    Pharmacy has been notified of approval and will contact patient when medication is ready for pickup.

## 2021-06-17 NOTE — PATIENT INSTRUCTIONS - HE
Patient Instructions by Amy Conde RN at 7/17/2019  2:30 PM     Author: Amy Conde RN Service: -- Author Type: Registered Nurse    Filed: 7/17/2019  3:08 PM Encounter Date: 7/17/2019 Status: Signed    : Amy Conde RN (Registered Nurse)       Patient Education     Radiation Therapy Treatment  Radiation therapy can help you in your fight against cancer. It begins with a session to discuss treatment with your doctor. If you and your doctor decide on radiation, you will return for a simulation. The simulation is a planning session that helps the doctor target your cancer. He or she will design a radiation plan to protect normal tissues. When the simulation and plan are completed, you will begin your daily treatments. Treatment is usually once daily Monday to Friday. It takes less than a half an hour. Sometimes you may need radiation twice a day, with usually 6 hours between treatments. After the course of radiation is complete, you will be scheduled for follow-up appointments. This is to make sure the cancer is under control. The follow-ups will also make sure that any side effects from the treatment are taken care of.  Radiation therapy uses high-energy X-rays to kill cancer cells.   Your treatment planning visit: The simulation  Your radiation therapy team uses a special machine called a simulator to map out your treatment. The simulator is usually an X-ray machine (fluoroscopy), CT scanner, MRI scanner, or PET-CT scanner machine. Laser lights act as guides to help position your body accurately. During this visit:    The team figures out the best position for your body. They make notes in your chart so youll be placed the same way each time.    You may use special devices to keep your body correctly positioned and still during treatment. These may include molds, masks, rests, and blocks.    The team makes ink marks on your skin. These will help you get in the same position for each treatment.  Tiny permanent tattoos may also be used.     Markers such as metal balls or wires may be put on or in your body. Sometime these are taped to the skin to help with the imaging process. These work with the X-rays to position your body. The markers are removed when the visit is over.  After the team has the imaging and data, the information is sent into the computer planning system. Your doctor and the team of physicists and dosimetrists design a treatment field. The field will best target your cancer and how it might spread. It will also help limit radiation to nearby normal tissues.  Your treatments  Each treatment usually takes 10 to 30 minutes. You may need to change into a hospital gown. The radiation therapist puts you in the correct position on the treatment table, then leaves the room. Sometimes you may need more imaging before each treatment. The machine may take digital X-rays or a CT scan to help make sure you are lined up correctly. During treatment, lie as still as you can and breathe normally. You will hear noises coming from the machine. You can talk to the radiation therapist, who watches you from the control room on a TV monitor. After treatment, the therapist will help you off the table. You can then get dressed and go back to your normal activities.  Date Last Reviewed: 1/14/2016 2000-2017 [employer]. [address] [place] [address], [place] [address]. All rights reserved. This information is not intended as a substitute for professional medical care. Always follow your healthcare professional's instructions.

## 2021-06-18 NOTE — LETTER
Letter by Gale Rizo MD at      Author: Gale Rizo MD Service: -- Author Type: --    Filed:  Encounter Date: 2/26/2019 Status: (Other)           February 26, 2019        Maura Elder  [address] [place] [address]        Dear Maura,    Breast cancer is the most common type of cancer among American women. The earlier breast cancer is detected, the better the survival rate. Your best defense against breast cancer is having a screening mammogram on a regular basis. The American Cancer Society recommends that women with an average risk of breast cancer should undergo regular screening mammography starting at age 45 years.         Women aged 45 to 54 years should have a mammogram annually.     Women 55 years and older should have a mammogram at least every other year.     There may be important reasons for you to follow a more frequent screening plan or an earlier start for breast cancer screening, so please discuss your health history and your family health history with your primary care provider.       We reviewed our records and we do not see that you had a mammogram within the past two years. If you have not had a mammogram in the past two years, we strongly encourage you to call for your appointment today. For your convenience, we have included a phone number below for you to schedule your mammogram at a nearby [facility] location.      If you have had a recent mammogram or have questions about why you are receiving this letter, please contact your primary care clinic at [phone] or send a [employer] message  ([email or web address]). Your clinic will answer any questions or help obtain recent mammogram reports for your chart at [facility] so we can keep record of your preventive care.       Sincerely,    Gale Rizo MD    and your primary care team at [facility]  Schedule your mammogram today at one of our 7 locations  Please call   [phone]

## 2021-06-18 NOTE — PROGRESS NOTES
Assessment/Plan:        1. Abnormal stools    - Comprehensive Metabolic Panel  - HM1(CBC and Differential)  - Ova and Parasite, Stool; Future  - HM1 (CBC with Diff)    We will follow-up pending the results of the study to manage accordingly              Subjective:    Patient ID:   Maura Elder is a 47 y.o. female comes in concerned with having abnormal stools the past 3 days as she seen yellow blobs with her bowel movements.  Her dog has recently been diagnosed with having GERD and tapeworms she has no other abdominal symptoms of pain diarrhea.   Nausea vomiting or excess flatulence.    She states that had a diarrhea last Saturday once but been stable since and has no fever.        Review of Systems  Constitutional: negative  Eyes: negative  ENT and face: negative  Respiratory: negative  CV: negative  GI: see HPI  : nl   Skin: negative     The following patient's history were reviewed and updated as appropriate:   She  has a past medical history of LOIS I (cervical intraepithelial neoplasia I) (2015)..      Outpatient Encounter Prescriptions as of 5/23/2018   Medication Sig Dispense Refill     calcium carbonate (OS-CAROLINE) 600 mg (1,500 mg) tablet Take 600 mg by mouth 2 (two) times a day with meals.       ERGOCALCIFEROL, VITAMIN D2, (VITAMIN D2 ORAL) Take by mouth.       L.acidophilus-Bif. animalis (PROBIOTIC) 5 billion cell CpSP Take 1 capsule by mouth daily.       multivitamin with minerals (THERA-M) 9 mg iron-400 mcg Tab tablet Take 1 tablet by mouth daily.       No facility-administered encounter medications on file as of 5/23/2018.          Objective:   /62 (Patient Site: Right Arm, Patient Position: Sitting, Cuff Size: Adult Regular)  Pulse 77  Temp 98.1  F (36.7  C) (Oral)   Wt 175 lb (79.4 kg)  SpO2 97%  BMI 26.61 kg/m2      Physical Exam  General Appearance:    Alert,  no acute distress, well hydrated    Eyes:    PERRL, conjunctiva/corneas clear, non icterus    Throat:   Lips, mucosa, and  tongue normal; teeth and gums normal   Neck:   Supple, symmetrical, trachea midline, no adenopathy;        thyroid:  No enlargement/tenderness/nodules; no carotid    bruit or JVD   Lungs:     Clear to auscultation bilaterally, respirations unlabored   Heart:    Regular rate and rhythm, S1 and S2 normal, no murmur, rub   or gallop   Abdomen:      Soft, NT, ND , normal bowel sounds, no rebound or guarding, no masses, no organomegaly   Skin:   Skin color, texture, turgor normal, no rashes or lesions

## 2021-06-19 NOTE — LETTER
Letter by Amy Conde RN at      Author: Amy Conde RN Service: -- Author Type: --    Filed:  Encounter Date: 9/6/2019 Status: (Other)         September 6, 2019     Patient: Maura Elder   YOB: 1970   Date of Visit: 9/6/2019       To Whom It May Concern:    It is my medical opinion that Maura Elder should remain out of work from 9/24/2019 - 10/24/2019. She may return to work without restrictions 10/25/2019. Maura will be getting treatment that could cause mild cognitive difficulties and fatigue that could effect her work. These side effects should be fully subsided by her return to work.     If you have any questions or concerns, please don't hesitate to call.    Sincerely,        Electronically signed by Maura Giordano MD

## 2021-06-19 NOTE — LETTER
Letter by Kelsey Hutchinson, Genetic Counselor at      Author: Kelsey Hutchinson, Genetic Counselor Service: -- Author Type: --    Filed:  Encounter Date: 8/21/2019 Status: (Other)         Maura Elder  [address]  [place] [address]      August 21, 2019      Dear MsKushal Elder,    It was a pleasure meeting with you at [facility] on 08/21/2019.  Here is a copy of the progress note from your recent genetic counseling visit.  If you have any additional questions, please feel free to call.    Referring Provider: Dr. Giordano    Presenting Information:   I met with Maura Elder today for genetic counseling at the [facility] to discuss her recent diagnosis of an AVM in her left parietal lobe as well as her family history of an AVM as well as her personal history of CLL and family history of pancreatic, ovarian, and colon cancers.  She is here today to review this history and available genetic testing options.    Personal History:   Maura is a 49 y.o. year old female.  She was recently diagnosed with CLL as the result of a suspicious lymph nodes on her most recent mammogram. 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 She is being followed by Dr. Giordano and will be undergoing radiosurgery in  September.     Family History: Family history was obtained today (Please see scanned pedigree for detailed family history information):    Lora's sister had a daughter who was a stillborn due to anencephaly. She has two other healthy children.    Lora's mother, age 74, has a history of a pulmonary embolism at age 73. Lora also reports that her mother may have born with a possible hip birth defect. She reports that her mother has has colon polyps removed on her colonoscopy, but she in unsure of the type and the number.    Lora's maternal grandfather passed away at age 81  "from pancreatic cancer. He also had a history of pancreatitis.    Lora's maternal great grandfather passed away with a history of colon cancer.    One of Lora's maternal great aunts passed from a \"female\" cancer.    One of Lora's maternal great aunts passed from a brain tumor.    Lora's father, age 74, has a history of Meniere's disease, pericarditis, and Afib. He has had less than 10 colon polyps removed across his colonoscopies.    Lora's paternal uncle, age 81, has a history of an AVM in his brain. He also has a history of a heart attack, kidney stones, seizures, and Afib.    Lora's paternal uncle passed away at age 82 with a history of a benign brain tumor in his early 70's.    Lora's paternal grandmother passed away at age 51 with a history of ovarian cancer at age 50.     Lora's paternal great uncle passed from colon cancer at age 85.    Her maternal ethnicity is Maori, [place], and Lebanese. Her paternal ethnicity is Kosovan and Lebanese.  There is no reported consanguinity.    Discussion-Family History of Cancer:    Maura's family history of pancreatic and ovarian cancer is suggestive of a hereditary cancer syndrome.    We reviewed the features of sporadic, familial, and hereditary cancers. In looking at Maura's family history, it is possible that a cancer susceptibility gene is present due to her maternal grandfather's pancreatic cancer and her paternal grandmother's ovarian cancer.    We discussed the natural history and genetics of hereditary gastrointestinal and gynecologic cancers.     We reviewed that the most common cause of hereditary breast cancer is Hereditary Breast and Ovarian Cancer (HBOC) syndrome, which is caused by mutations in the genes BRCA1 and BRCA2.  BRCA1 and BRCA2 are two genes that increase the risk for breast and ovarian cancers, among others. Women who inherit a BRCA mutation have a 50 to 85% lifetime risk of breast cancer and a 15 to 45% lifetime risk of ovarian cancer. " This is higher than the general population lifetime risks of 12% for breast cancer and less than 2% for ovarian cancer. Men with BRCA gene mutations have up to a 7% risk of breast cancer and 20% risk of prostate cancer. Other cancers, such as pancreatic cancer and melanoma, have also been associated with BRCA mutations.    Given the family history of pancreatic and more distantly colon cancer, we discussed nuñez syndrome. Nuñez syndrome can be caused by a mutation in one of five genes:  MLH1, MSH2, MSH6, PMS2, and EPCAM.  Nuñez syndrome may present with polyps, but typically a small number.  The highest cancer risks associated with Nuñez syndrome include colon cancer, endometrial/uterine cancer, gastric cancer, and ovarian cancer.    Based on her personal and family history, Maura meets current National Comprehensive Cancer Network (NCCN) criteria for genetic testing of BRCA1 and BRCA2.      We discussed that there are additional genes that could cause increased risk for pancreatic and gynecologic cancers. As many of these genes present with overlapping features in a family and accurate cancer risk cannot always be established based upon the pedigree analysis alone, it would be reasonable for Maura to consider panel genetic testing to analyze multiple genes at once.    After our discussion, Mary expressed interest in CancerNext genetic testing through [facility].   Genetic testing is available for 34 genes associated with hereditary cancer: CancerNext (APC, SUZANNA, BARD1, BRCA1, BRCA2, BRIP1, BMPR1A, CDH1, CDK4, CDKN2A, CHEK2, DICER1, EPCAM, GREM1, HOXB13, MLH1, MRE11A, MSH2, MSH6, MUTYH, NBN, NF1, PALB2, PMS2, POLD1, POLE, PTEN, RAD50, RAD51C, RAD51D, SMAD4, SMARCA4, STK11, and TP53).  We discussed that many of the genes in the CancerNext panel are associated with specific hereditary cancer syndromes and published management guidelines: Hereditary Breast and Ovarian Cancer syndrome (BRCA1, BRCA2), Nuñez  syndrome (MLH1, MSH2, MSH6, PMS2, EPCAM), Familial Adenomatous Polyposis (APC), Hereditary Diffuse Gastric Cancer (CDH1), Familial Atypical Multiple Mole Melanoma syndrome (CDK4, CDKN2A), Juvenile Polyposis syndrome (BMPR1A, SMAD4), Cowden syndrome (PTEN), Li Fraumeni syndrome (TP53), Peutz-Jeghers syndrome (STK11), MUTYH Associated Polyposis (MUTYH), and Neurofibromatosis type 1 (NF1).   The SUZANNA, BRIP1, CHEK2, GREM1, NBN, PALB2, POLD1, POLE, RAD51C, and RAD51D genes are associated with increased cancer risk and have published management guidelines for certain cancers.    The remaining genes (BARD1, DICER1, HOXB13, MRE11A, RAD50, and SMARCA4) are associated with increased cancer risk and may allow us to make medical recommendations when mutations are identified.      Medical Management: For  Maura, we reviewed that the information from genetic testing may determine:    additional cancer screening for which Maura may qualify (i.e. mammogram and breast MRI, more frequent colonoscopies, more frequent dermatologic exams, etc.),    options for risk reducing surgeries Maura could consider (i.e. bilateral mastectomy, surgery to remove her ovaries and/or uterus, etc.),      and targeted chemotherapies for Maura if she were to develop certain cancers in the future (i.e. immunotherapy for individuals with Parnell syndrome, PARP inhibitors, etc.).     These recommendations and possible targeted chemotherapies will be discussed in detail once genetic testing is completed.       Discussion- Personal History of Cerebral AVM    We reviewed the features and genetics of HTT.  We discussed that HHT causes problems with blood vessel development. Individuals with HHT tend to have fewer small blood vessels called capillaries. Fewer capillaries can increase the pressure and blood flow in other vessels, decreasing oxygen transport, and/or causing vessels to burst.     We discussed that common symptoms of HHT  include:    Telangiectasias - typically of the lips, hands, face, intestines, and around the mouth    Arteriovenous malformations (AVMs) - typically of the liver, lungs, brain, and spine    Nosebleeds    GI bleeding    Other symptoms, including migraine, seizures, stroke, back pain, and in some cases, heart failure.     Lora has a cerebral AVM but denies having telangectasias, nosebleeds, GI bleeding, and the other features related to HHT. Of note, her paternal uncle has a history of a cerebral AVM, heart failure, and seizures. She denies further family history of the features assocaited with HHT.    Symptoms of HHT can present differently in individuals, even within the same family. Individuals with HHT tend to experience more symptoms as they get older, however, children can present with symptoms of HHT. We discussed that at-risk children and adults should have screening for manifestations of HHT under the care of a physician who is knowledgeable about the condition.    HHT genetic testing includes six genes: ENG, ACVRL1, EPHB4, SMAD4, GDF2, and RASA1. The ENG, ACVRL1, SMAD4, and GDF2 genes have been associated with HHT. EPHB4 and RASA1 are associated with capillary malformation-arteriovenous malformation syndrome (CM-AVM), which can have similar features to HHT.    About 85% of cases of HHT are caused by ENG or ACVRL1 mutations.    We reviewed that the SMAD4 gene is associated with juvenile polyposis/hereditary hemorrhagic telangiectasia syndrome. Individuals with mutations in this gene are also at risk to develop particular kinds of gastrointestinal polyps (juvenile polyps), and are at risk for certain gastrointestinal cancers, including stomach and colon cancer.     We discussed the autosomal dominant inheritance of HHT. All children (both males and females) of a parent with a mutation in an HHT gene have a 50% chance of inheriting the same gene mutation.  Those who do not inherit the gene mutation causative  of HHT cannot pass it on to their children (i.e., it cannot skip generations).     Genetic testing is available for: HHTNext at [facility], which involves sequencing and deletion/duplication analysis of ENG, EPHB4, ACVRL1, SMAD4, GDF2, and RASA1.      Genetic testing could confirm a diagnosis of HHT in Maura, and would allow for genetic testing of her family members. This would help identify at-risk relatives to ensure early and appropriate screening.  Should testing identify a SMAD4 mutation, appropriate cancer screening would also be recommended for Maura.     Lora expressed the desire to move forward with genetic testing for both her family history of cancer and HHT. We discussed that given her diagnosis of CLL, the laboratory may deny running the genetic testing on Lora's blood. I have spoken with a representative from [facility], and they suggested either sending in a blood sample keeping in mind it could be deemed insufficient to run genetic testing in which case skin biopsy would be recommended. Alternatively, I could try and send her most recent CBC and flow cytometry to [facility] to see if they could determine if a blood sample would be sufficient from these records. After our discussion, Lora opted to send CBC and flow cytometry records to the lab to see if a blood sample would be possible.     Plan:  1. I will send most recent bloodwork to [facility].  2. I will contact Lora to discuss next steps as soon as I hear back from the laboratory.     Kelsey Hutchinson MS, [facility]  Licensed Genetic Counselor  [facility]  [phone]

## 2021-06-19 NOTE — LETTER
Letter by Blanca Lizama RN at      Author: Blanca Lizama RN Service: -- Author Type: --    Filed:  Encounter Date: 4/30/2019 Status: (Other)       Dear Maura:    Thank you for choosing [facility] for your care.  We are committed to providing you with the highest quality and compassionate healthcare services.  The following information pertains to your first appointment with our clinic.    Date/Time of appointment: Tuesday, May 7, 2019--please arrive no later than 11:00am for your 11:30am consult    Note: We have you arrive 30 minutes prior to your appointment time.  This allows time to complete forms, possible labs and nursing assessment.     Name of your Physician: Meghann Waters MD     What to bring to your appointment:    Completed Patient History/Initial Nursing Assessment and Medication/Allergy List (these forms were sent to you).    Any paperwork or films from your physician that we have asked you to bring.    Your current insurance card(s).    Parking:    Please refer to the map included to direct you.  The [facility] is located at the [address] end of [facility] in [place], [place].      After turning onto [address] from [address] [facility], take a right turn at the first stop sign.  We have designated parking on the left, identified as parking for Cancer Care patients (Lot D).     The Code to Enter Lot D is: 0501. This code changes monthly and will always coincide with the current month followed by 01. For example August will be 0801.  The month will continue to change but the 01 will remain constant.  If lot D is full please use Parking Lot A, directly across the street.    Please enter the Cancer Care Center on the north end of the [facility].  You will see a sign on the building.        For Medical Oncology or Hematology appointments, please take the elevator to the second floor to check in.   We hope these instructions are helpful to you.  If you have any  questions or concerns, please call us at [phone].  It is our pleasure to assist you.    Warm Regards,      Blanca Lizama  Nurse Navigator  [phone]

## 2021-06-22 NOTE — PATIENT INSTRUCTIONS - HE
Whole Foods, Plant-Based Diet    Eat abundant vegetables.    Only eat whole grains.    2-4 fruits/day.    Enjoy at least 2 servings of legumes (beans) or tofu daily.      Avoid or greatly diminish animal products such as dairy, eggs and meat.     Supplement with 1,000mcg vitamin B12 and possibly a calcium/vitamin D supplement such as Caltrate+D3 daily.    Avoid processed foods, sugars and oils.    Cook your own food as much as possible.    [place] [employer] Documentary - watch online.    [email or web address] - information

## 2021-06-22 NOTE — PROGRESS NOTES
[facility] Clinic Note    Patient Name: Maura Elder  Patient Age: 48 y.o.  YOB: 1970  MRN: [id number]    Date of visit: 2019    Patient Active Problem List   Diagnosis     Bloating (Symptom)     Cyst On The Right Ovary     Nontoxic Autonomous Thyroid Nodule     Tinnitus     Social History     Social History Narrative     Not on file     Family History   Problem Relation Age of Onset     Arthritis Mother         s/p hip replacement     No Medical Problems Father      No Medical Problems Sister      No Medical Problems Daughter      No Medical Problems Maternal Grandmother      Pancreatic cancer Maternal Grandfather      Cancer Paternal Grandmother         ? type     at age 50     No Medical Problems Paternal Grandfather      No Medical Problems Maternal Aunt      No Medical Problems Paternal Aunt      BRCA 1/2 Neg Hx      Breast cancer Neg Hx      Colon cancer Neg Hx      Endometrial cancer Neg Hx      Ovarian cancer Neg Hx      Clotting disorder Neg Hx      Outpatient Encounter Medications as of 2019   Medication Sig Dispense Refill     calcium carbonate (OS-CAROLINE) 600 mg (1,500 mg) tablet Take 600 mg by mouth 2 (two) times a day with meals.       ERGOCALCIFEROL, VITAMIN D2, (VITAMIN D2 ORAL) Take by mouth.       L.acidophilus-Bif. animalis (PROBIOTIC) 5 billion cell CpSP Take 1 capsule by mouth daily.       multivitamin with minerals (THERA-M) 9 mg iron-400 mcg Tab tablet Take 1 tablet by mouth daily.       diclofenac sodium (VOLTAREN) 1 % Gel Apply 2 g topically every 6 (six) hours as needed. Apply 2 grams per application. 1 Tube 1     No facility-administered encounter medications on file as of 2019.        Chief Complaint:   Chief Complaint   Patient presents with     Arm Pain     RIGHT ARM PAIN IN CERTAIN POSITIONS FOR 1X MONTH        BP 96/60 (Patient Site: Right Arm, Patient Position: Sitting, Cuff Size: Adult Regular)   Pulse 73   Resp 16   Wt 179 lb (81.2 kg)   SpO2  99%   Breastfeeding? No   BMI 27.22 kg/m    HPI:   RIGHT ARM PAIN X 1 MONTH. STARTS at elbow over lateral epicondyle.  Shaking hands, squeezing objects hurt.  No new activities.  No memory of injury.  No distal nv sx.      ROS: Pertinent ros findings in hpi, all other systems negative.    Objective/Physical Exam:     BP 96/60 (Patient Site: Right Arm, Patient Position: Sitting, Cuff Size: Adult Regular)   Pulse 73   Resp 16   Wt 179 lb (81.2 kg)   SpO2 99%   Breastfeeding? No   BMI 27.22 kg/m      Able to fully extend: yes  Able to fully flex: yes  Able to fully supinate: yes  Able to fully pronate: yes    Swelling: n  Ecchymosis: no  Tender to palpation: lateral epicondyle only    Lateral laxity:no  Medial laxity: no    Shoulder ttp:no  Wrist ttp, swelling: no    Vascular:  Skin: warm, no pallor or cyanosis  Cap refill: <2 sec  Radial pulse: 3+    Strength:  Finger abduction: 5/5  Thumb apposition: 5/5  Wrist extension: 5/5    Fine touch sensation: intact  Tone: normal      Assessment/Plan:  No results found for this or any previous visit (from the past 24 hour(s)).  Medications Ordered   Medications     diclofenac sodium (VOLTAREN) 1 % Gel     Sig: Apply 2 g topically every 6 (six) hours as needed. Apply 2 grams per application.     Dispense:  1 Tube     Refill:  1     goodrx       ICD-10-CM    1. Lateral epicondylitis of right elbow M77.11        We will try tennis elbow brace and Voltaren gel and activity modification.  If not improving with this will refer her to Ortho.    Patient Instructions   Whole Foods, Plant-Based Diet    Eat abundant vegetables.    Only eat whole grains.    2-4 fruits/day.    Enjoy at least 2 servings of legumes (beans) or tofu daily.      Avoid or greatly diminish animal products such as dairy, eggs and meat.     Supplement with 1,000mcg vitamin B12 and possibly a calcium/vitamin D supplement such as Caltrate+D3 daily.    Avoid processed foods, sugars and oils.    Cook your own  food as much as possible.    [place] [employer] Documentary - watch online.    [email or web address] - information        Counseled patient regarding treatments, treatment options, risks and benefits and diagnosis.  The patient was interactive, attentive, verbalized understanding, and we discussed plan.     Rudy Brothers MD

## 2021-06-23 NOTE — TELEPHONE ENCOUNTER
Fax received from [facility] pharmacy requesting Prior Authorization    Medication Name: Diclofenac Sodium 1% gel    Insurance Plan:    PBM: [employer]   Patient ID Number: [id number]    Please start PA process

## 2021-06-23 NOTE — PROGRESS NOTES
FEMALE ADULT PREVENTIVE EXAM    CHIEF COMPLAINT:  Female preventive exam.    SUBJECTIVE:  Maura Elder is a 48 y.o. female who presents for her routine physical exam.    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  More stress, no time for self cares.  Moved this summer to a house with a lot stairs.        Had tennis elbow, improved a couple weeks ago.    Mother had a recent PE; 1 week after travelling to [place] with a 5 hour car ride - did not get out of car during trip.    GYNE HISTORY  Menses: regular  Sexually Active: with .  Contraception: condoms.    Last Pap: 2017 - normal neg HPV.  Abnormal Pap: LSIL 2016 - colposcopy x 3  Vaginal Discharge: no      She  has a past medical history of LOIS I (cervical intraepithelial neoplasia I) (2015).    Lab Results   Component Value Date    WBC 12.7 (H) 02/06/2019    HGB 12.6 02/06/2019    HCT 38.6 02/06/2019    MCV 91 02/06/2019     02/06/2019     05/23/2018    K 4.1 05/23/2018    BUN 15 05/23/2018     Lab Results   Component Value Date    CHOL 229 (H) 02/06/2019    HDL 50 02/06/2019    LDLCALC 143 (H) 02/06/2019    TRIG 179 (H) 02/06/2019     Lab Results   Component Value Date    TSH 2.3 09/18/2013     BP Readings from Last 3 Encounters:   02/06/19 96/60   01/09/19 96/60   05/23/18 110/62       Surgeries:    Past Surgical History:   Procedure Laterality Date     NO PAST SURGERIES         Family History:  Her family history includes Arthritis in her mother; Cancer in her paternal grandmother; No Medical Problems in her daughter, father, maternal aunt, maternal grandmother, paternal aunt, paternal grandfather, and sister; Pancreatic cancer in her maternal grandfather; Pulmonary embolism (age of onset: 73) in her mother.    Social History:  She  reports that  has never smoked. she has never used  smokeless tobacco. She reports that she drinks about 1.2 oz of alcohol per week. She reports that she does not use drugs.    Medications:    Current Outpatient Medications:      calcium carbonate (OS-CAROLINE) 600 mg (1,500 mg) tablet, Take by mouth daily.    , Disp: , Rfl:      ERGOCALCIFEROL, VITAMIN D2, (VITAMIN D2 ORAL), Take 1 tablet by mouth daily.    , Disp: , Rfl:      L.acidophilus-Bif. animalis (PROBIOTIC) 5 billion cell CpSP, Take 1 capsule by mouth daily., Disp: , Rfl:      multivitamin with minerals (THERA-M) 9 mg iron-400 mcg Tab tablet, Take 1 tablet by mouth daily., Disp: , Rfl:      psyllium (METAMUCIL) 3.4 gram packet, Take 1 packet by mouth daily., Disp: , Rfl:   HELD MEDICATIONS: None.    Allergies:  No latex allergies.  Allergies   Allergen Reactions     Penicillins Rash            RISK BEHAVIOR & HEALTH HABITS  Self Breast Exam: yes  Regular Exercise: no time.  But does about 30 stairs daily with [employer].  Walking a couple blocks to walk the dog. Balanced diet: yes, doing benefiber  Seat Belt Use: yes  Calcium intake/Osteoporosis prevention: through supplement.  Dexa: not due  Colonoscopy:not due  Mammogram: 2017.    Guns: NO  Sun Screen: YES  Dental Care: YES    REVIEW OF SYSTEMS:  Complete head to toe review of systems is otherwise negative except as above.    OBJECTIVE:  VITAL SIGNS:    Vitals:    02/06/19 1031   BP: 96/60   Pulse: 67   SpO2: 99%     GENERAL:  Patient alert, in no acute distress.  EYES: PERRLA. Extraoccular movements intact, pupils equal, reactive to light and accommodation.  Normal conjunctiva and lids.  Undilated fudoscopic exam normal, including normal size, appearance cup-to-disc ratio.  Normal posterior segments, including no exudates or hemorrhages.  ENT:  Hearing grossly normal.  Normal appearance to ears and nose.  Bilateral TM s, external canals, oropharynx normal. Normal lips, gums and teeth.  Normal nasal mucosa, septum and turbinates.  NECK:  Supple, without  thyromegaly or mass.  RESP:  Clear to auscultation without crackles, wheezes or distress.  Normal respiratory effort.   CV:  Regular rate and rhythm without murmurs, rubs or gallops.  Normal carotid, abdominal aorta, femoral and pedal pulses.  No varicosities or edema.  ABDOMEN:  Soft, non-tender, without hepatosplenomegaly, masses, or hernias.   BREASTS:  Nontender, without masses, nipple discharge, erythema, or axillary adenopathy.    :    External genitalia:  Normal without lesions.  Urethra: Normal appearing  Vagina: Normal without discharge  Cervix: Not due.  Uterus:  Nontender, mobile, without masses  Ovaries: Normal without masses  LYMPHATIC: Normal palpation of neck, groin and axilla..  No lymphadenopathy.  No bruising.  NEURO:  CN II-XII intact, motor & sensory function all intact.  DTR and reflexes normal.  PSYCHIATRIC:  Alert & oriented with normal mood and affect.  Good judgment and insight.  SKIN:  Normal inspection and palpation.  MUSCULOSKELETAL: Normal gait and station.  - Spine / Ribs / Pelvis: Normal inspection, ROM, stability and strength: Spine, Head, Neck, Upper and Lower Extremities.    ASSESSMENT & PLAN  Maura was seen today for annual exam.    Diagnoses and all orders for this visit:    Routine general medical examination at a health care facility  Patient has a new job and until she can hire someone to take her old job, she is having difficulty finding time for exercise.  She is eating healthy.  Family history reviewed.    Dyslipidemia  -     Lipid Cascade  Reviewed the benefits of fiber and exercise.     Screening for diabetes mellitus  -     Glucose    Visit for screening mammogram  -     Mammo Screening Bilateral; Future    Lymphadenopathy  -     HM1(CBC and Differential)  -     HM1 (CBC with Diff)  Palpable lymph nodes today under the mandible.  May be viral.  Will check a CBC.

## 2021-06-25 NOTE — PROGRESS NOTES
"Progress Notes by Janel Clemente MD at 2/23/2017  8:15 AM     Author: Janel Clemente MD Service: -- Author Type: Physician    Filed: 2/23/2017  8:51 AM Encounter Date: 2/23/2017 Status: Signed    : Janel Clemente MD (Physician)       Colposcopy Procedure Note    Indications: Recent pap smear showed LSIL with type 16 HPV. Type 82 was also present but not a high risk type.  Pertinent past history includes ASCUS with type 16 HPV in 2014 with biopsy c/w koilocytic changes and LSIL with type 16 HPV in 2015 with biopsy at 6:00 c/w LOIS I.  She has been taking probiotics and turmeric, but she has not really been able to reduce stress.    Procedure Details   Visit Vitals   ? /62 (Patient Site: Right Arm, Patient Position: Sitting, Cuff Size: Adult Regular)   ? Pulse 84   ? Ht 5' 8\" (1.727 m)   ? Wt 173 lb (78.5 kg)   ? LMP 01/31/2017 (Exact Date)     Body mass index is 26.3 kg/(m^2).    The reason for procedure is explained, and informed consent is obtained.  Urine hCG is negative.    Speculum is placed in the vagina and visualization of cervix is achieved. The cervix is swabbed with 3% acetic acid solution. Transformation zone is easily seen.  Green filter is applied with no abnormal vessels seen.  Thick acetowhite epithelium is seen at 6:00.  Biopsy is not taken as it appears the same as last year.  Adequate colposcopy.  The patient tolerated the procedure well.      Impression: LOIS I    Plan: Post procedure instructions are reviewed.  The role of HPV in causing cervical pap smear abnormalities, dysplasia, and cancer is reviewed with the patient.  I again recommended keeping the immune system strong.  We discussed that this can take several years to clear and that I would recommend a Pap with HPV in a year.       "

## 2021-06-29 ENCOUNTER — COMMUNICATION - HEALTHEAST (OUTPATIENT)
Dept: SCHEDULING | Facility: CLINIC | Age: 51
End: 2021-06-29

## 2021-06-29 DIAGNOSIS — C91.10 CLL (CHRONIC LYMPHOCYTIC LEUKEMIA) (H): ICD-10-CM

## 2021-07-03 NOTE — ADDENDUM NOTE
Addendum Note by Katie Rizo MD at 2/7/2020 10:20 AM     Author: Katie Rizo MD Service: -- Author Type: Physician    Filed: 2/19/2020  6:17 PM Encounter Date: 2/7/2020 Status: Signed    : Katie Rizo MD (Physician)    Addended by: KATIE RIZO on: 2/19/2020 06:17 PM        Modules accepted: Orders

## 2021-07-03 NOTE — ADDENDUM NOTE
Addendum Note by Shantel Gusman RN at 6/25/2019  9:00 AM     Author: Shantel Gusman RN Service: -- Author Type: Registered Nurse    Filed: 6/25/2019 10:19 AM Encounter Date: 6/25/2019 Status: Signed    : Shantel Gusman RN (Registered Nurse)    Addended by: SHANTEL GUSMAN on: 6/25/2019 10:19 AM        Modules accepted: Orders

## 2021-07-04 NOTE — TELEPHONE ENCOUNTER
Telephone Encounter by Sheba Sloan CMA at 6/30/2021  9:29 AM     Author: Sheba Sloan CMA Service: -- Author Type: Certified Medical Assistant    Filed: 6/30/2021  9:30 AM Encounter Date: 6/29/2021 Status: Signed    : Sheba Sloan CMA (Certified Medical Assistant)       Left detailed message for patient informing her that Dr. Rizo has placed lab orders for her. Please help her schedule a lab appointment when she calls back.    GF/CLARISSE

## 2021-07-04 NOTE — TELEPHONE ENCOUNTER
Telephone Encounter by Alissa Swain CMA at 6/29/2021  4:12 PM     Author: Alissa Swain CMA Service: -- Author Type: Certified Medical Assistant    Filed: 6/29/2021  4:13 PM Encounter Date: 6/29/2021 Status: Signed    : Alissa Swain CMA (Certified Medical Assistant)       Ok for the lab without seeing her?    Alissa Swain CMA (AAMA)

## 2021-07-04 NOTE — TELEPHONE ENCOUNTER
Patient calling, requesting serology test done to see if vaccine took formed antibodies. Transferred to set up virtual visit with provider.     Ivett Guallpa RN 06/29/21 2:01 PM  [facility] Triage Nurse Advisor

## 2021-07-04 NOTE — TELEPHONE ENCOUNTER
Telephone Encounter by Gale Rizo MD at 6/29/2021  7:17 PM     Author: Gale Rizo MD Service: -- Author Type: Physician    Filed: 6/29/2021  7:18 PM Encounter Date: 6/29/2021 Status: Signed    : Gale Rizo MD (Physician)       Orders placed.  Please notify to make lab appointment.

## 2021-07-04 NOTE — TELEPHONE ENCOUNTER
Reason for Call: Request for an order or referral:    Order or referral being requested: Pt would like a Covid serology test done. She had her vaccines in March and early April and will be traveling and would like to make sure she has antibodies before she travels.     Date needed: as soon as possible    Has the patient been seen by the PCP for this problem? NO    Additional comments:     Phone number Patient can be reached at:    Cell number on file:    Telephone Information:   Mobile [phone]       Best Time:      Can we leave a detailed message on this number?  Yes    Call taken on 6/29/2021 at 2:13 PM by Clarice Ag

## 2021-07-04 NOTE — ADDENDUM NOTE
Addendum Note by Katie Rizo MD at 6/29/2021  7:18 PM     Author: Katie Rizo MD Service: -- Author Type: Physician    Filed: 6/29/2021  7:18 PM Encounter Date: 6/29/2021 Status: Signed    : Katie Rizo MD (Physician)    Addended by: KATIE RIZO on: 6/29/2021 07:18 PM        Modules accepted: Orders

## 2021-07-06 ENCOUNTER — AMBULATORY - HEALTHEAST (OUTPATIENT)
Dept: LAB | Facility: CLINIC | Age: 51
End: 2021-07-06

## 2021-07-06 DIAGNOSIS — C91.10 CLL (CHRONIC LYMPHOCYTIC LEUKEMIA) (H): ICD-10-CM

## 2021-07-09 ENCOUNTER — COMMUNICATION - HEALTHEAST (OUTPATIENT)
Dept: FAMILY MEDICINE | Facility: CLINIC | Age: 51
End: 2021-07-09

## 2021-07-09 ENCOUNTER — COMMUNICATION - HEALTHEAST (OUTPATIENT)
Dept: SCHEDULING | Facility: CLINIC | Age: 51
End: 2021-07-09

## 2021-07-21 ENCOUNTER — RECORDS - HEALTHEAST (OUTPATIENT)
Dept: ADMINISTRATIVE | Facility: CLINIC | Age: 51
End: 2021-07-21

## 2021-07-22 ENCOUNTER — OFFICE VISIT (OUTPATIENT)
Dept: FAMILY MEDICINE | Facility: CLINIC | Age: 51
End: 2021-07-22
Payer: COMMERCIAL

## 2021-07-22 ENCOUNTER — RECORDS - HEALTHEAST (OUTPATIENT)
Dept: FAMILY MEDICINE | Facility: CLINIC | Age: 51
End: 2021-07-22

## 2021-07-22 VITALS
HEART RATE: 80 BPM | OXYGEN SATURATION: 99 % | WEIGHT: 177 LBS | BODY MASS INDEX: 26.52 KG/M2 | DIASTOLIC BLOOD PRESSURE: 60 MMHG | SYSTOLIC BLOOD PRESSURE: 94 MMHG

## 2021-07-22 DIAGNOSIS — R06.83 SNORING: ICD-10-CM

## 2021-07-22 DIAGNOSIS — Z12.31 OTHER SCREENING MAMMOGRAM: ICD-10-CM

## 2021-07-22 DIAGNOSIS — C91.10 CHRONIC LYMPHOCYTIC LEUKEMIA (H): Primary | ICD-10-CM

## 2021-07-22 PROCEDURE — 99213 OFFICE O/P EST LOW 20 MIN: CPT | Performed by: FAMILY MEDICINE

## 2021-07-22 NOTE — PROGRESS NOTES
Assessment & Plan   Maura was seen today for covid concern and insomnia.    Diagnoses and all orders for this visit:    Chronic lymphocytic leukemia (H)  Patient had completed her Covid vaccination with Pfizer.  Based on her medical condition, we did check serum titers for antibodies, and fortunately they were negative.  Patient did have plans to go with her family to [place] in August.  We did discuss that there is a rise in delta variant in [facility] at this time.  Essentially her serum titers are equivalent to being unvaccinated.  At this time there are no current recommendations on repeating the series.  At this time patient is planning on rescheduling her trip.    Snoring  -     SLEEP EVALUATION & MANAGEMENT REFERRAL - ADULT -; Future   has noticed quite a bit of snoring, but also episodes of gasping.  Will refer her for sleep evaluation.      No follow-ups on file.    Gale Rizo MD  [facility]   Lora is a 50 year old who presents for the following health issues     HPI     1) CLL: Due to her diagnosis, we decided to check on her Covid vaccine titers after her immunization with the Pfizer vaccine.  Unfortunately, her antibodies were undetectable.  I discussed that this is equivalent to being unvaccinated.  She and her family will plan to go to [facility] in 2 weeks.  We did discuss the rise in the delta variant that is occurring in [place] at this time.  And how currently it appears that illness is more severe for those that are unvaccinated.  Patient's questions were answered.    2) snoring:  Patient notes that she snores a lot.  notes it sounds like gasping.  Dry mouth.  No allergy medication.      Possible Sleep Apnea:    STOP-Bang Total Score 7/22/2021   Total Score 1   Risk Stratification 0 - 2: Low Risk for SAV     Objective    BP 94/60 (BP Location: Left arm, Patient Position: Sitting, Cuff Size: Adult Regular)   Pulse 80   Wt 80.3 kg (177  lb)   LMP 07/02/2021   SpO2 99%   BMI 26.52 kg/m    Body mass index is 26.52 kg/m .  Physical Exam   Gen: Patient is alert and oriented,NAD

## 2021-08-07 ENCOUNTER — HEALTH MAINTENANCE LETTER (OUTPATIENT)
Age: 51
End: 2021-08-07

## 2021-08-09 ENCOUNTER — HOSPITAL ENCOUNTER (OUTPATIENT)
Dept: MRI IMAGING | Facility: CLINIC | Age: 51
Discharge: HOME | End: 2021-08-09
Attending: RADIOLOGY | Admitting: RADIOLOGY
Payer: COMMERCIAL

## 2021-08-09 VITALS — BODY MASS INDEX: 26.22 KG/M2 | HEIGHT: 69 IN | WEIGHT: 177 LBS

## 2021-08-09 DIAGNOSIS — Q28.2 AVM (ARTERIOVENOUS MALFORMATION) BRAIN: ICD-10-CM

## 2021-08-09 PROCEDURE — 70544 MR ANGIOGRAPHY HEAD W/O DYE: CPT

## 2021-08-09 ASSESSMENT — MIFFLIN-ST. JEOR: SCORE: 1479.31

## 2021-08-09 NOTE — PROGRESS NOTES
Maura Elder is a 50 year old who is being evaluated via a billable video visit.      How would you like to obtain your AVS? MyChart  If the video visit is dropped, the invitation should be resent by: Text to cell phone: [phone]  Will anyone else be joining your video visit? No    Does patient have any form of state insurance?No   Do you have wifi? Yes  Do you have a smart phone/device?Yes  Can you download an essence on your phone comfortably with out assistance including You Tube? Yes    Sonu Moya MA    Video Start Time: 10:04 AM  Video-Visit Details    Type of service:  Video Visit    Video End Time:10:23 AM    Originating Location (pt. Location): Home    Distant Location (provider location):  @[email or web address]@     Platform used for Video Visit: AmWell    Additional 15 minutes was spent performing the following:    -Preparing to see the patient  -Ordering medications, tests, or procedures   -Documenting clinical information in the electronic or other health record     Thank you for the opportunity to participate in the care of  Maura Elder.    Assessment and Plan:    In summary Maura Elder is a 50 year old year old female here for sleep disturbance.  1. Hypersomnia/Snoring/Frequent nocturnal awakening/Dry mouth   Maura Elder has high risk for obstructive sleep apnea based on the history of hypersomnia, snoring, frequent nocturnal awakening and a crowded airway. I educated the patient on the underlying pathophysiology of obstructive sleep apnea. We reviewed the risks associated with sleep apnea, including increased cardiovascular risk and overall death. We talked about treatments briefly. I recommend getting a Home sleep study. The patient should return to the clinic to discuss results and treatment option in a patient-centered approach.    History of present illness:    She is a 50 year old female who comes to the [facility] clinic with a chief complaint of excessive  daytime sleepiness that been going on since January of this year.  Although patient denies any episodes of witnessed apnea, she has been observed by her  as having loud gasping and snoring during sleep.  While using Flonase did help decrease the snoring she is not sure if other issues are at play.  She would frequently wake up with dry mouth which would force her to drink a lot of water which in turn would then cause her to urinate frequently at night.     Ideal Sleep-Wake Cycle(devoid of societal pressure):    Patient would try to initiate sleep at around 10:30-11 PM with a sleep latency of less than 10 minutes. The patient would have 2-3 awakenings. Final wake up time is around 6:30 AM.    ESS:  4  Total score - [place]: 4 (2021 10:00 AM)    Patient told to return in one week after the sleep study is interpreted.    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      Past Medical History:   Diagnosis Date     AVM (arteriovenous malformation) brain      LOIS I (cervical intraepithelial neoplasia I)      HPV (human papilloma virus) infection      Leukemia (H)     CLL     Shingles      Past Surgical History:   Procedure Laterality Date     APPENDECTOMY  2012      SECTION  2011     IR CEREBRAL ANGIOGRAM  2019     IR CEREBRAL ANGIOGRAM  2019     Current Outpatient Medications   Medication Sig Dispense Refill     calcium carbonate (OS-CAROLINE) 1500 (600 Ca) MG tablet        cholecalciferol 25 MCG (1000 UT) TABS Take 1,000 Units by mouth       Multiple Vitamin (MULTIVITAMIN ADULT PO)        Probiotic Product (PROBIOTIC DAILY PO) (42 billions) 1 capsule daily.       UNABLE TO FIND MEDICATION NAME: Green tea extract-1200 mg daily.       Penicillins  Social History      Socioeconomic History     Marital status:      Spouse name: Not on file     Number of children: Not on file     Years of education: Not on file     Highest education level: Not on file   Occupational History     Not on file   Tobacco Use     Smoking status: Never Smoker     Smokeless tobacco: Never Used   Substance and Sexual Activity     Alcohol use: Yes     Alcohol/week: 1.0 standard drinks     Drug use: No     Sexual activity: Never     Partners: Male   Other Topics Concern     Not on file   Social History Narrative     Not on file     Social Determinants of Health     Financial Resource Strain:      Difficulty of Paying Living Expenses:    Food Insecurity:      Worried About Running Out of Food in the Last Year:      Ran Out of Food in the Last Year:    Transportation Needs:      Lack of Transportation (Medical):      Lack of Transportation (Non-Medical):    Physical Activity:      Days of Exercise per Week:      Minutes of Exercise per Session:    Stress:      Feeling of Stress :    Social Connections:      Frequency of Communication with Friends and Family:      Frequency of Social Gatherings with Friends and Family:      Attends [employer] Services:      Active Member of Clubs or Organizations:      Attends Club or Organization Meetings:      Marital Status:    Intimate Partner Violence:      Fear of Current or Ex-Partner:      Emotionally Abused:      Physically Abused:      Sexually Abused:      Family History   Problem Relation Age of Onset     Arthritis Mother         s/p hip replacement     Pulmonary Embolism Mother 73.00        former smoker, recent drive to [place] for 5 hours     No Known Problems Father      No Known Problems Sister      No Known Problems Daughter      No Known Problems Maternal Grandmother      Pancreatic Cancer Maternal Grandfather      Cancer Paternal Grandmother         ? type     at age 50     No Known Problems Paternal Grandfather      No Known Problems Maternal Aunt       No Known Problems Paternal Aunt      Hereditary Breast and Ovarian Cancer Syndrome No family hx of      Breast Cancer No family hx of      Endometrial Cancer No family hx of      Ovarian Cancer No family hx of      Clotting Disorder No family hx of         Physical Exam:  GEN: NAD,   Head: Normocephalic.  EYES: EOMI  ENT: Oropharynx is clear, Lal class 4+ airway.   Psych: normal mood, normal affect  Snore test:(+)     Labs/Studies:     No results found for: PH, PHARTERIAL, PO2, [id number], SAT, PCO2, HCO3, BASEEXCESS, NAYANA, BEB  No results found for: TSH  Lab Results   Component Value Date    GLC 94 05/01/2019    GLC 90 02/06/2019     Lab Results   Component Value Date    HGB 12.1 06/21/2019    HGB 12.3 04/26/2019     Lab Results   Component Value Date    BUN 13 05/01/2019    BUN 15 05/23/2018    CR 0.73 06/21/2019    CR 0.72 05/01/2019     Lab Results   Component Value Date    AST 11 05/01/2019    AST 13 05/23/2018    ALT 13 05/01/2019    ALT 15 05/23/2018    ALKPHOS 65 05/01/2019    ALKPHOS 75 05/23/2018    BILITOTAL 0.5 05/01/2019    BILITOTAL 0.6 05/23/2018     No results found for: UAMP, UBARB, BENZODIAZEUR, UCANN, UCOC, OPIT, UPCP    Recent Labs   Lab Test 06/21/19  0806 05/01/19  1211 02/06/19  1143 05/23/18  0923   NA  --  141  --  141   POTASSIUM  --  4.0  --  4.1   CHLORIDE  --  105  --  104   CO2  --  24  --  27   ANIONGAP  --  12  --  10   GLC  --  94 90 96   BUN  --  13  --  15   CR 0.73 0.72  --  0.75   CAROLINE  --  10.1  --  9.4       No results found for: JOSÉ MIGUEL Mariano DO  Board Certified in Internal Medicine and Sleep Medicine    (Note created with Dragon voice recognition and unintended spelling errors and word substitutions may occur)

## 2021-08-09 NOTE — PATIENT INSTRUCTIONS
Your BMI is Body mass index is 26.52 kg/m .  Weight management is a personal decision.  If you are interested in exploring weight loss strategies, the following discussion covers the approaches that may be successful. Body mass index (BMI) is one way to tell whether you are at a healthy weight, overweight, or obese. It measures your weight in relation to your height.  A BMI of 18.5 to 24.9 is in the healthy range. A person with a BMI of 25 to 29.9 is considered overweight, and someone with a BMI of 30 or greater is considered obese. More than two-thirds of American adults are considered overweight or obese.  Being overweight or obese increases the risk for further weight gain. Excess weight may lead to heart disease and diabetes.  Creating and following plans for healthy eating and physical activity may help you improve your health.  Weight control is part of healthy lifestyle and includes exercise, emotional health, and healthy eating habits. Careful eating habits lifelong are the mainstay of weight control. Though there are significant health benefits from weight loss, long-term weight loss with diet alone may be very difficult to achieve- studies show long-term success with dietary management in less than 10% of people. Attaining a healthy weight may be especially difficult to achieve in those with severe obesity. In some cases, medications, devices and surgical management might be considered.  What can you do?  If you are overweight or obese and are interested in methods for weight loss, you should discuss this with your provider.     Consider reducing daily calorie intake by 500 calories.     Keep a food journal.     Avoiding skipping meals, consider cutting portions instead.    Diet combined with exercise helps maintain muscle while optimizing fat loss. Strength training is particularly important for building and maintaining muscle mass. Exercise helps reduce stress, increase energy, and improves fitness.  Increasing exercise without diet control, however, may not burn enough calories to loose weight.       Start walking three days a week 10-20 minutes at a time    Work towards walking thirty minutes five days a week     Eventually, increase the speed of your walking for 1-2 minutes at time    In addition, we recommend that you review healthy lifestyles and methods for weight loss available through the National Institutes of Health patient information sites:  http://win.niddk.nih.gov/publications/index.htm    And look into health and wellness programs that may be available through your health insurance provider, employer, local community center, or [employer].    Weight management plan: Patient was referred to their PCP to discuss a diet and exercise plan.  What is a Home Sleep Study?    your doctor can give you a portable sleep monitor to use at home, so you don t have to spend the night in the sleep lab. But you should use a portable monitor only if:   ?Your doctor thinks you have a condition that makes you stop breathing for short periods while you are asleep, called  sleep apnea.    ?You do not have other serious medical problems, such as heart disease or lung disease.    Please bring the home sleep study device back to the sleep center as soon as you are finished with it so we can score it.     The cost of care estimate line is [phone]. They are able to give the patient an estimate of the charges and also an estimate of their insurance coverage/patient responsibility.   After your sleep study is performed, please call us at [phone] or [phone] to schedule for a follow up to review the results of the sleep study.    Consider using one tab of low dose melatonin 3 mg or less on the night of the study.    It is completely voluntary.    Do not drive or operate machinery after intake of melatonin.     Due to the pandemic, we have many people waiting in line for sleep studies- this wait list is improving  each week.   You should receive a call within 2 weeks from our staff to schedule you for  your test.   Depending on the delay in approval by your insurance carrier, the study will be completed within a few days to 2 weeks of that call.

## 2021-08-10 ENCOUNTER — VIRTUAL VISIT (OUTPATIENT)
Dept: SLEEP MEDICINE | Facility: CLINIC | Age: 51
End: 2021-08-10
Attending: FAMILY MEDICINE
Payer: COMMERCIAL

## 2021-08-10 DIAGNOSIS — G47.10 HYPERSOMNIA: Primary | ICD-10-CM

## 2021-08-10 DIAGNOSIS — R68.2 DRY MOUTH: ICD-10-CM

## 2021-08-10 DIAGNOSIS — G47.00 FREQUENT NOCTURNAL AWAKENING: ICD-10-CM

## 2021-08-10 DIAGNOSIS — R06.83 SNORING: ICD-10-CM

## 2021-08-10 PROCEDURE — 99203 OFFICE O/P NEW LOW 30 MIN: CPT | Mod: 95 | Performed by: INTERNAL MEDICINE

## 2021-08-14 ENCOUNTER — MYC MEDICAL ADVICE (OUTPATIENT)
Dept: FAMILY MEDICINE | Facility: CLINIC | Age: 51
End: 2021-08-14

## 2021-08-16 ENCOUNTER — OFFICE VISIT (OUTPATIENT)
Dept: RADIATION ONCOLOGY | Facility: CLINIC | Age: 51
End: 2021-08-16
Attending: RADIOLOGY
Payer: COMMERCIAL

## 2021-08-16 VITALS
SYSTOLIC BLOOD PRESSURE: 111 MMHG | RESPIRATION RATE: 16 BRPM | OXYGEN SATURATION: 97 % | TEMPERATURE: 98.2 F | HEART RATE: 75 BPM | WEIGHT: 179.7 LBS | BODY MASS INDEX: 26.93 KG/M2 | DIASTOLIC BLOOD PRESSURE: 55 MMHG

## 2021-08-16 DIAGNOSIS — Q28.2 AVM (ARTERIOVENOUS MALFORMATION) BRAIN: Primary | ICD-10-CM

## 2021-08-16 DIAGNOSIS — I67.1 CEREBRAL ANEURYSM, NONRUPTURED: ICD-10-CM

## 2021-08-16 PROCEDURE — G0463 HOSPITAL OUTPT CLINIC VISIT: HCPCS

## 2021-08-16 PROCEDURE — 99214 OFFICE O/P EST MOD 30 MIN: CPT | Performed by: RADIOLOGY

## 2021-08-16 NOTE — LETTER
8/16/2021         RE: Maura Elder  [address]  [place] [address]        Dear Colleague,    Thank you for referring your patient, Maura Elder, to the [facility] RADIATION ONCOLOGY [place]. Please see a copy of my visit note below.    [facility] Radiation Oncology Follow Up Note    Patient: Maura Elder  MRN: [id number]  Date of Service: 08/16/2021    Assessment:       ICD-10-CM    1. AVM (arteriovenous malformation) brain  Q28.2 CTA Head with Contrast     CTA Head with Contrast   2. Cerebral aneurysm, nonruptured  I67.1 CTA Head with Contrast     CTA Head with Contrast          Impression/Plan:   50 year old female with incidentally found AVM, 24.8 x 10.1 x 6.7 mm nidus in the left parietal lobe, no deep venous drainage.  Speech left hemisphere dominant but 2 cm away from nidus, finger tapping anterior to dominant draining vein per fMRI. Asymptomatic. Spetzler-Alex score of 1. Received 1800 cGy/1 fx on 9/24/2019.      1. MRA brain wo contrast from 8/9/2021 personally evaluated. It appears the focus of this study was primarily on the stable 1.3 cm aneurysm off the cavernous segment of the left internal carotid artery. The superior aspect of the AVM was not included in the study. I discussed with Dr. Kristofer Farris of neuroradiology to see if there were any further sequences to evaluate the superior portion of the AVM and there was not.     Nonetheless, the patient actually would like to revisit treatment options for the aneurysm. I have ordered CTA head w contrast per recommendations from Dr. Farris. The patient is in agreement with the plan to follow up with Dr. Farris for further evaluation.     2. Further follow up pending Dr. Farris's evaluation.     Subjective:     HPI:  Maura Elder is a 50 year old female who was treated with radiation therapy for a left parietal lobe AVM.      Patient with recent diagnosis of CLL which was originally discovered  through axillary adenopathy that was present on mammogram in 2019.  Further work up included CT soft tissue neck on 2019 which incidentally discovered a 8 x 9 mm cavernous ICA aneurysm. Further evaluation with CTA had showed an AVM in the lateral superior left frontal parietal area measuring approximately 2 cm x 1 cm. Subsequent IR cerebral angiogram showed the AVM to measure 24.8 x 10.1 x 6.7 mm nidus in the left parietal lobe, no deep venous drainage. A functional MRI was obtained on 2019, Wernicke's activation approximately 1.5 - 2cm from nidus.      SITE TREATED: Left parietal AVM  TOTAL DOSE: 1  NUMBER OF FRACTIONS: 1800  DATES COMPLETED: 2019  CONCURRENT CHEMOTHERAPY: No  ADJUVANT THERAPY:No     She tolerated the treatment without unexpected side effects.     The patient presents today to discuss radiation therapy. She has noticed some difficulties with sleeping/snoring since 2021 and is getting a sleepy study soon. Otherwise no complaints.     Past Medical History:   Diagnosis Date     AVM (arteriovenous malformation) brain      LOIS I (cervical intraepithelial neoplasia I)      HPV (human papilloma virus) infection      Leukemia (H)     CLL     Shingles      Past Surgical History:   Procedure Laterality Date     APPENDECTOMY  2012      SECTION  2011     IR CEREBRAL ANGIOGRAM  2019     IR CEREBRAL ANGIOGRAM  2019     Current Outpatient Medications   Medication     calcium carbonate (OS-CAROLINE) 1500 (600 Ca) MG tablet     cholecalciferol 25 MCG (1000 UT) TABS     Multiple Vitamin (MULTIVITAMIN ADULT PO)     Probiotic Product (PROBIOTIC DAILY PO)     UNABLE TO FIND     No current facility-administered medications for this visit.     Penicillins  Social History     Socioeconomic History     Marital status:      Spouse name: Not on file     Number of children: Not on file     Years of education: Not on file     Highest education level: Not on file    Occupational History     Not on file   Tobacco Use     Smoking status: Never Smoker     Smokeless tobacco: Never Used   Substance and Sexual Activity     Alcohol use: Yes     Alcohol/week: 1.0 standard drinks     Drug use: No     Sexual activity: Never     Partners: Male   Other Topics Concern     Not on file   Social History Narrative     Not on file     Social Determinants of Health     Financial Resource Strain:      Difficulty of Paying Living Expenses:    Food Insecurity:      Worried About Running Out of Food in the Last Year:      Ran Out of Food in the Last Year:    Transportation Needs:      Lack of Transportation (Medical):      Lack of Transportation (Non-Medical):    Physical Activity:      Days of Exercise per Week:      Minutes of Exercise per Session:    Stress:      Feeling of Stress :    Social Connections:      Frequency of Communication with Friends and Family:      Frequency of Social Gatherings with Friends and Family:      Attends [employer] Services:      Active Member of Clubs or Organizations:      Attends Club or Organization Meetings:      Marital Status:    Intimate Partner Violence:      Fear of Current or Ex-Partner:      Emotionally Abused:      Physically Abused:      Sexually Abused:        ROS:  Reviewed with Maura grover.      General  Constitutional  Constitutional (WDL): All constitutional elements are within defined limits  EENT  Eye Disorders  Eye Disorder (WDL): All eye disorder elements are within defined limits  Ear Disorders  Ear Disorder (WDL): All ear disorder elements are within defined limits  Respiratory    Respiratory  Respiratory (WDL): Exceptions to WDL (planning to have sleep study done soon for snoring)  Cardiovascular  Cardiovascular  Cardiovascular (WDL): All cardiovascular elements are within defined limits  Gastrointestinal  Gastrointestinal  Gastrointestinal (WDL): Exceptions to WDL  Dry Mouth: Symptomatic (e.g., dry or thick saliva) without  significant dietary alteration OR unstimulated saliva flow greater than 0.2 mL/min  Musculoskeletal  Musculoskeletal and Connective Tissue Disorders  Musculoskeletal & Connective (WDL): All musculoskeletal & connective elements are within defined limits  Integumentary                 Neurological  Neurosensory  Neurosensory (WDL): All neurosensory elements are within defined limits  Genitourinary/Reproductive  Genitourinary  Genitourinary (WDL): All genitourinary elements are within defined limits  Lymphatic   Lymph System Disorders  Lymph (WDL): Assessment not pertinent to visit  Patient Coping  Patient Coping: Accepting  Pain   Pain Score: No Pain (0)   AUA Assessment                                                                         Accompanied by  Accompanied By: family ()      Objective:        Exam:    Vitals:    08/16/21 1600 08/16/21 1611   BP:  111/55   Pulse:  75   Resp:  16   Temp:  98.2  F (36.8  C)   TempSrc:  Oral   SpO2:  97%   Weight:  81.5 kg (179 lb 11.2 oz)   PainSc: No Pain (0)      GENERAL: No acute distress. Cooperative in conversation. Mask on.   RESP: Normal respiratory effort.   NEURO: Non focal. Alert and oriented x3.   PSYCH: Within normal limits. No depression or anxiety.  SKIN: Warm dry intact.         Recent Labs: No results found for this or any previous visit (from the past 168 hour(s)).    Imaging: Imaging results 30 days: MRA Brain ([facility]) wo Contrast    Result Date: 8/9/2021  EXAM: MRA BRAIN ([place] [facility]) WO CONTRAST LOCATION: [facility] DATE/TIME: 8/9/2021 9:15 AM INDICATION: S/P SRS (09/2019) for AVM, post treatment follow up. COMPARISON: MRA of the head 2/11/2021. TECHNIQUE: 3D time-of-flight head MRA without intravenous contrast. FINDINGS: Left parietal arteriovenous malformation is present which is predominantly fed via a left middle cerebral artery inferior division branch extending to the cortex of the left inferior  parietal lobule. Superficial venous components along the left inferior parietal lobule have decreased in size compared to the prior exam, however the superior part of the malformation is not included in the current field of view. Saccular aneurysm projecting inferiorly and laterally off the cavernous segment of the left internal carotid artery is unchanged measuring up to 1.3 cm in oblique transverse diameter. The vertebral arteries, basilar artery, and posterior cerebral arteries are patent. Internal carotid arteries, anterior cerebral arteries, and middle cerebral arteries are patent. Exam is otherwise unremarkable.     IMPRESSION: 1.  Left parietal arteriovenous malformation demonstrates decreased distention of superficial venous components along the left inferior parietal lobule, however the more superior part of the malformation is not included in the current field of view. 2.  No change of 1.3 cm aneurysm off the cavernous segment of the left internal carotid artery.      Pathology:   No results found for this or any previous visit (from the past 8760 hour(s)).        30 minutes spent on the date of the encounter doing chart review, review of test results, interpretation of tests, patient visit, documentation, discussion with other provider.      I, Maura Giordano MD personally performed the services described in this documentation, as scribed by Sukhdev Gibson in my presence, and it is both accurate and complete.    Signed by: Maura Giordano MD, MPH      Patient here ambulatory accompanied by  for follow up s/p radiation for her AVM.  Patient had MRI and here today for results.  Seen by Dr. Giordano.  Plan return to clinic for follow up as directed by physician.      Again, thank you for allowing me to participate in the care of your patient.        Sincerely,        Maura Giordano MD

## 2021-08-16 NOTE — PROGRESS NOTES
Patient here ambulatory accompanied by  for follow up s/p radiation for her AVM.  Patient had MRI and here today for results.  Seen by Dr. Giordano.  Plan return to clinic for follow up as directed by physician.

## 2021-08-16 NOTE — PROGRESS NOTES
[facility] Radiation Oncology Follow Up Note    Patient: Maura Elder  MRN: [id number]  Date of Service: 08/16/2021    Assessment:       ICD-10-CM    1. AVM (arteriovenous malformation) brain  Q28.2 CTA Head with Contrast     CTA Head with Contrast   2. Cerebral aneurysm, nonruptured  I67.1 CTA Head with Contrast     CTA Head with Contrast          Impression/Plan:   50 year old female with incidentally found AVM, 24.8 x 10.1 x 6.7 mm nidus in the left parietal lobe, no deep venous drainage.  Speech left hemisphere dominant but 2 cm away from nidus, finger tapping anterior to dominant draining vein per fMRI. Asymptomatic. Spetzler-Alex score of 1. Received 1800 cGy/1 fx on 9/24/2019.      1. MRA brain wo contrast from 8/9/2021 personally evaluated. It appears the focus of this study was primarily on the stable 1.3 cm aneurysm off the cavernous segment of the left internal carotid artery. The superior aspect of the AVM was not included in the study. I discussed with Dr. Kristofer Farris of neuroradiology to see if there were any further sequences to evaluate the superior portion of the AVM and there was not.     Nonetheless, the patient actually would like to revisit treatment options for the aneurysm. I have ordered CTA head w contrast per recommendations from Dr. Farris. The patient is in agreement with the plan to follow up with Dr. Farris for further evaluation.     2. Further follow up pending Dr. Farris's evaluation.     Subjective:     HPI:  Maura Elder is a 50 year old female who was treated with radiation therapy for a left parietal lobe AVM.      Patient with recent diagnosis of CLL which was originally discovered through axillary adenopathy that was present on mammogram in April 2019.  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 Further evaluation with CTA had showed an AVM in the lateral superior left  frontal parietal area measuring approximately 2 cm x 1 cm. Subsequent IR cerebral angiogram showed the AVM to measure 24.8 x 10.1 x 6.7 mm nidus in the left parietal lobe, no deep venous drainage. A functional MRI was obtained on 2019, Wernicke's activation approximately 1.5 - 2cm from nidus.      SITE TREATED: Left parietal AVM  TOTAL DOSE: 1  NUMBER OF FRACTIONS: 1800  DATES COMPLETED: 2019  CONCURRENT CHEMOTHERAPY: No  ADJUVANT THERAPY:No     She tolerated the treatment without unexpected side effects.     The patient presents today to discuss radiation therapy. She has noticed some difficulties with sleeping/snoring since 2021 and is getting a sleepy study soon. Otherwise no complaints.     Past Medical History:   Diagnosis Date     AVM (arteriovenous malformation) brain      LOIS I (cervical intraepithelial neoplasia I)      HPV (human papilloma virus) infection      Leukemia (H)     CLL     Shingles      Past Surgical History:   Procedure Laterality Date     APPENDECTOMY  2012      SECTION  2011     IR CEREBRAL ANGIOGRAM  2019     IR CEREBRAL ANGIOGRAM  2019     Current Outpatient Medications   Medication     calcium carbonate (OS-CAROLINE) 1500 (600 Ca) MG tablet     cholecalciferol 25 MCG (1000 UT) TABS     Multiple Vitamin (MULTIVITAMIN ADULT PO)     Probiotic Product (PROBIOTIC DAILY PO)     UNABLE TO FIND     No current facility-administered medications for this visit.     Penicillins  Social History     Socioeconomic History     Marital status:      Spouse name: Not on file     Number of children: Not on file     Years of education: Not on file     Highest education level: Not on file   Occupational History     Not on file   Tobacco Use     Smoking status: Never Smoker     Smokeless tobacco: Never Used   Substance and Sexual Activity     Alcohol use: Yes     Alcohol/week: 1.0 standard drinks     Drug use: No     Sexual activity: Never     Partners: Male    Other Topics Concern     Not on file   Social History Narrative     Not on file     Social Determinants of Health     Financial Resource Strain:      Difficulty of Paying Living Expenses:    Food Insecurity:      Worried About Running Out of Food in the Last Year:      Ran Out of Food in the Last Year:    Transportation Needs:      Lack of Transportation (Medical):      Lack of Transportation (Non-Medical):    Physical Activity:      Days of Exercise per Week:      Minutes of Exercise per Session:    Stress:      Feeling of Stress :    Social Connections:      Frequency of Communication with Friends and Family:      Frequency of Social Gatherings with Friends and Family:      Attends Mandaen Services:      Active Member of Clubs or Organizations:      Attends Club or Organization Meetings:      Marital Status:    Intimate Partner Violence:      Fear of Current or Ex-Partner:      Emotionally Abused:      Physically Abused:      Sexually Abused:        ROS:  Reviewed with Maura grover.      General  Constitutional  Constitutional (WDL): All constitutional elements are within defined limits  EENT  Eye Disorders  Eye Disorder (WDL): All eye disorder elements are within defined limits  Ear Disorders  Ear Disorder (WDL): All ear disorder elements are within defined limits  Respiratory    Respiratory  Respiratory (WDL): Exceptions to WDL (planning to have sleep study done soon for snoring)  Cardiovascular  Cardiovascular  Cardiovascular (WDL): All cardiovascular elements are within defined limits  Gastrointestinal  Gastrointestinal  Gastrointestinal (WDL): Exceptions to WDL  Dry Mouth: Symptomatic (e.g., dry or thick saliva) without significant dietary alteration OR unstimulated saliva flow greater than 0.2 mL/min  Musculoskeletal  Musculoskeletal and Connective Tissue Disorders  Musculoskeletal & Connective (WDL): All musculoskeletal & connective elements are within defined limits  Integumentary                  Neurological  Neurosensory  Neurosensory (WDL): All neurosensory elements are within defined limits  Genitourinary/Reproductive  Genitourinary  Genitourinary (WDL): All genitourinary elements are within defined limits  Lymphatic   Lymph System Disorders  Lymph (WDL): Assessment not pertinent to visit  Patient Coping  Patient Coping: Accepting  Pain   Pain Score: No Pain (0)   AUA Assessment                                                                         Accompanied by  Accompanied By: family ()      Objective:        Exam:    Vitals:    08/16/21 1600 08/16/21 1611   BP:  111/55   Pulse:  75   Resp:  16   Temp:  98.2  F (36.8  C)   TempSrc:  Oral   SpO2:  97%   Weight:  81.5 kg (179 lb 11.2 oz)   PainSc: No Pain (0)      GENERAL: No acute distress. Cooperative in conversation. Mask on.   RESP: Normal respiratory effort.   NEURO: Non focal. Alert and oriented x3.   PSYCH: Within normal limits. No depression or anxiety.  SKIN: Warm dry intact.         Recent Labs: No results found for this or any previous visit (from the past 168 hour(s)).    Imaging: Imaging results 30 days: MRA Brain ([place] [facility]) wo Contrast    Result Date: 8/9/2021  EXAM: MRA BRAIN ([place] [facility]) WO CONTRAST LOCATION: [facility] DATE/TIME: 8/9/2021 9:15 AM INDICATION: S/P SRS (09/2019) for AVM, post treatment follow up. COMPARISON: MRA of the head 2/11/2021. TECHNIQUE: 3D time-of-flight head MRA without intravenous contrast. FINDINGS: Left parietal arteriovenous malformation is present which is predominantly fed via a left middle cerebral artery inferior division branch extending to the cortex of the left inferior parietal lobule. Superficial venous components along the left inferior parietal lobule have decreased in size compared to the prior exam, however the superior part of the malformation is not included in the current field of view. Saccular aneurysm projecting inferiorly and  laterally off the cavernous segment of the left internal carotid artery is unchanged measuring up to 1.3 cm in oblique transverse diameter. The vertebral arteries, basilar artery, and posterior cerebral arteries are patent. Internal carotid arteries, anterior cerebral arteries, and middle cerebral arteries are patent. Exam is otherwise unremarkable.     IMPRESSION: 1.  Left parietal arteriovenous malformation demonstrates decreased distention of superficial venous components along the left inferior parietal lobule, however the more superior part of the malformation is not included in the current field of view. 2.  No change of 1.3 cm aneurysm off the cavernous segment of the left internal carotid artery.      Pathology:   No results found for this or any previous visit (from the past 8760 hour(s)).        30 minutes spent on the date of the encounter doing chart review, review of test results, interpretation of tests, patient visit, documentation, discussion with other provider.      I, Maura Giordano MD personally performed the services described in this documentation, as scribed by Sukhdev Gibson in my presence, and it is both accurate and complete.    Signed by: Maura Giordano MD, MPH

## 2021-08-17 ENCOUNTER — PATIENT OUTREACH (OUTPATIENT)
Dept: CARE COORDINATION | Facility: CLINIC | Age: 51
End: 2021-08-17

## 2021-08-17 NOTE — PROGRESS NOTES
Oncology Distress Screening Follow-up  Clinical Social Work  [facility]    Identified Concern and Score From Distress Screenin. How concerned are you about work and home life issues that may be affected by your cancer?   9Abnormal        Date of Distress Screenin21    Data: Lora is a 50 year old woman diagnosed with CLL (H) and left parietal lobe AVM. Lora had a provider visit with Dr. Giordano where she expressed concern about how her home/work life will be impacted by her diagnosis.     Intervention: SW attempted to reach out to Lora today via phone to discuss her concerns further. SW received no answer and a message was left encouraging a call back. SW also sent a Amara message with contact info.    Education Provided: Oncology Clinic SW contact info    Follow-up Required: SW will await a call back. SW will remain available for ongoing resource/support needs.    AMY Sheets, [facility]  Adult Oncology Clinic  Phone: [phone]

## 2021-09-01 ENCOUNTER — HOSPITAL ENCOUNTER (OUTPATIENT)
Dept: CT IMAGING | Facility: CLINIC | Age: 51
Discharge: HOME | End: 2021-09-01
Attending: RADIOLOGY | Admitting: RADIOLOGY
Payer: COMMERCIAL

## 2021-09-01 DIAGNOSIS — I67.1 CEREBRAL ANEURYSM, NONRUPTURED: ICD-10-CM

## 2021-09-01 DIAGNOSIS — Q28.2 AVM (ARTERIOVENOUS MALFORMATION) BRAIN: ICD-10-CM

## 2021-09-01 PROCEDURE — 70496 CT ANGIOGRAPHY HEAD: CPT

## 2021-09-01 PROCEDURE — 250N000011 HC RX IP 250 OP 636: Performed by: RADIOLOGY

## 2021-09-01 RX ORDER — IOPAMIDOL 755 MG/ML
100 INJECTION, SOLUTION INTRAVASCULAR ONCE
Status: COMPLETED | OUTPATIENT
Start: 2021-09-01 | End: 2021-09-01

## 2021-09-01 RX ADMIN — IOPAMIDOL 75 ML: 755 INJECTION, SOLUTION INTRAVENOUS at 18:15

## 2021-09-23 ENCOUNTER — OFFICE VISIT (OUTPATIENT)
Dept: FAMILY MEDICINE | Facility: CLINIC | Age: 51
End: 2021-09-23
Payer: COMMERCIAL

## 2021-09-23 VITALS
SYSTOLIC BLOOD PRESSURE: 86 MMHG | BODY MASS INDEX: 26.42 KG/M2 | HEIGHT: 68 IN | DIASTOLIC BLOOD PRESSURE: 56 MMHG | HEART RATE: 68 BPM | OXYGEN SATURATION: 99 % | WEIGHT: 174.31 LBS

## 2021-09-23 DIAGNOSIS — Z12.31 ENCOUNTER FOR SCREENING MAMMOGRAM FOR BREAST CANCER: ICD-10-CM

## 2021-09-23 DIAGNOSIS — R09.82 POSTNASAL DRIP: ICD-10-CM

## 2021-09-23 DIAGNOSIS — Z13.1 SCREENING FOR DIABETES MELLITUS: ICD-10-CM

## 2021-09-23 DIAGNOSIS — N92.6 IRREGULAR MENSES: ICD-10-CM

## 2021-09-23 DIAGNOSIS — Z01.419 ENCOUNTER FOR GYNECOLOGICAL EXAMINATION WITHOUT ABNORMAL FINDING: Primary | ICD-10-CM

## 2021-09-23 DIAGNOSIS — Z12.11 SPECIAL SCREENING FOR MALIGNANT NEOPLASMS, COLON: ICD-10-CM

## 2021-09-23 DIAGNOSIS — Z13.220 LIPID SCREENING: ICD-10-CM

## 2021-09-23 LAB
CHOLEST SERPL-MCNC: 250 MG/DL
FASTING STATUS PATIENT QL REPORTED: YES
FASTING STATUS PATIENT QL REPORTED: YES
GLUCOSE BLD-MCNC: 91 MG/DL (ref 70–125)
HDLC SERPL-MCNC: 39 MG/DL
LDLC SERPL CALC-MCNC: 171 MG/DL
TRIGL SERPL-MCNC: 199 MG/DL

## 2021-09-23 PROCEDURE — 87624 HPV HI-RISK TYP POOLED RSLT: CPT | Performed by: FAMILY MEDICINE

## 2021-09-23 PROCEDURE — 91300 PR COVID VAC PFIZER DIL RECON 30 MCG/0.3 ML IM: CPT | Performed by: FAMILY MEDICINE

## 2021-09-23 PROCEDURE — 0003A PR COVID VAC PFIZER DIL RECON 30 MCG/0.3 ML IM: CPT | Performed by: FAMILY MEDICINE

## 2021-09-23 PROCEDURE — 36415 COLL VENOUS BLD VENIPUNCTURE: CPT | Performed by: FAMILY MEDICINE

## 2021-09-23 PROCEDURE — 99396 PREV VISIT EST AGE 40-64: CPT | Mod: 25 | Performed by: FAMILY MEDICINE

## 2021-09-23 PROCEDURE — 80061 LIPID PANEL: CPT | Performed by: FAMILY MEDICINE

## 2021-09-23 PROCEDURE — 82947 ASSAY GLUCOSE BLOOD QUANT: CPT | Performed by: FAMILY MEDICINE

## 2021-09-23 PROCEDURE — G0123 SCREEN CERV/VAG THIN LAYER: HCPCS | Performed by: FAMILY MEDICINE

## 2021-09-23 ASSESSMENT — MIFFLIN-ST. JEOR: SCORE: 1459.05

## 2021-09-23 NOTE — PROGRESS NOTES
Gyn     SUBJECTIVE:   CC: Maura Elder is an 51 year old woman who presents for preventive health visit.       Patient has been advised of split billing requirements and indicates understanding: Yes  Healthy Habits:     Getting at least 3 servings of Calcium per day:  Yes    Bi-annual eye exam:  NO    Dental care twice a year:  Yes    Sleep apnea or symptoms of sleep apnea:  Daytime drowsiness and Excessive snoring    Diet:  Regular (no restrictions)    Frequency of exercise:  1 day/week    Duration of exercise:  Less than 15 minutes    Taking medications regularly:  No    Medication side effects:  Not applicable    PHQ-2 Total Score: 3    Additional concerns today:  Yes    Started seeing therapist again this week.  It has been hard due to work, chronic diagnoses, pandemic.  Thinking about a job change.  Seeing an acupuncturist on Saturday at [place].      1) AVM in left frontoparietal is nearly completely resolved.  There is slight increase in size of aneurusm at the cavernous segment left ICA.  She has met with Dr. Farris and is continuing with observation for now.    2) CLL: lymph nodes are getting big and bulky around the neck.  Patient met with Oncology, no treatment at this time.    3) Slight cough for a couple of weeks.  Not productive.  Notices it throughout the day.  Not so much at night.  Feels like a glob in the back of the throat particularly when she lays down.  No coughing with sleep.  It is like a throat clearing.  Drinks a lot of water.  Using flonase and benadryl each night which helps with snoring stop.  Dry mouth - drinking more water.  Switched to sleeping away from air conditioner.  Using a humidifier.    Getting third COVID vaccine on Monday.    Menses: regular.  Getting intermittent spotting midcycle.  Dark brown x 1-2 months.  LMP: 9/5/2021  Sexually Active: with .  Last Pap Smear: 2/07/2020  Abnormal Pap: endometrial cells in pap in 2020, but that was one day after  menses. LGSIL in 2016.      Today's PHQ-2 Score:   PHQ-2 ( 1999 Pfizer) 9/23/2021   Q1: Little interest or pleasure in doing things 1   Q2: Feeling down, depressed or hopeless 1   PHQ-2 Score 2   Q1: Little interest or pleasure in doing things Several days   Q2: Feeling down, depressed or hopeless Several days   PHQ-2 Score 2       Abuse: Current or Past (Physical, Sexual or Emotional) - No  Do you feel safe in your environment? Yes    Have you ever done Advance Care Planning? (For example, a Health Directive, POLST, or a discussion with a medical provider or your loved ones about your wishes): Yes, advance care planning is on file.    Social History     Tobacco Use     Smoking status: Never Smoker     Smokeless tobacco: Never Used   Substance Use Topics     Alcohol use: Yes     Alcohol/week: 1.0 standard drinks       Alcohol Use 9/23/2021   Prescreen: >3 drinks/day or >7 drinks/week? No       Reviewed orders with patient.  Reviewed health maintenance and updated orders accordingly - Yes      Breast Cancer Screening:  Any new diagnosis of family breast, ovarian, or bowel cancer? No    FHS-7: No flowsheet data found.    Mammogram Screening: Recommended annual mammography  Pertinent mammograms are reviewed under the imaging tab.    History of abnormal Pap smear: YES - updated in Problem List and Health Maintenance accordingly  PAP / HPV Latest Ref Rng & Units 2/7/2020 12/22/2017 12/21/2016   PAP Negative for squamous intraepithelial lesion or malignancy. Negative for squamous intraepithelial lesion or malignancy  Electronically signed by Eladia Solano CT (ASCP) on 2/18/2020 at  3:08 PM   Negative for squamous intraepithelial lesion or malignancy  Electronically signed by Eladia Solano CT (ASCP) on 1/2/2018 at 12:43 PM   LSIL encompassing HPV/mild dysplasia/CIN1  Electronically signed by Zackary Chaudhary MD on 12/30/2016 at 10:09 AM     HPV16 NEG Negative Negative -   HPV18 NEG Negative Negative -   HRHPV  "NEG Negative Negative -     Reviewed and updated as needed this visit by clinical staff  Tobacco  Allergies  Meds              Reviewed and updated as needed this visit by Provider                Past Medical History:   Diagnosis Date     AVM (arteriovenous malformation) brain      LOIS I (cervical intraepithelial neoplasia I)      HPV (human papilloma virus) infection      Leukemia (H)     CLL     Shingles       Past Surgical History:   Procedure Laterality Date     APPENDECTOMY  2012      SECTION  2011     IR CEREBRAL ANGIOGRAM  2019     IR CEREBRAL ANGIOGRAM  2019       Review of Systems  CONSTITUTIONAL: NEGATIVE for fever, chills, change in weight  INTEGUMENTARU/SKIN: NEGATIVE for worrisome rashes, moles or lesions  EYES: NEGATIVE for vision changes or irritation  ENT: cough per above.  RESP: NEGATIVE for significant cough or SOB  BREAST: NEGATIVE for masses, tenderness or discharge  CV: NEGATIVE for chest pain, palpitations or peripheral edema  GI: NEGATIVE for nausea, abdominal pain, heartburn, or change in bowel habits  : NEGATIVE for unusual urinary or vaginal symptoms. Periods are regular.  MUSCULOSKELETAL: NEGATIVE for significant arthralgias or myalgia  NEURO: NEGATIVE for weakness, dizziness or paresthesias  PSYCHIATRIC: NEGATIVE for changes in mood or affect     OBJECTIVE:   BP (!) 86/56 (BP Location: Left arm, Patient Position: Sitting, Cuff Size: Adult Regular)   Pulse 68   Ht 1.735 m (5' 8.31\")   Wt 79.1 kg (174 lb 5 oz)   LMP 2021 (Within Days)   SpO2 99%   BMI 26.27 kg/m    Physical Exam  GENERAL: healthy, alert and no distress  EYES: Eyes grossly normal to inspection, PERRL and conjunctivae and sclerae normal  HENT: TMs normal.  Nares congested bilaterally with enlarged turbinate on the left.  Clear postnasal drip.  Tonsils 1+.    NECK: thyroid normal to palpation  RESP: lungs clear to auscultation - no rales, rhonchi or wheezes  BREAST: normal " without masses, tenderness or nipple discharge and no palpable axillary masses or adenopathy  CV: regular rate and rhythm, normal S1 S2, no S3 or S4, no murmur, click or rub, no peripheral edema and peripheral pulses strong  ABDOMEN: soft, nontender, no hepatosplenomegaly, no masses and bowel sounds normal   (female): normal female external genitalia, normal urethral meatus, vaginal mucosa pink, moist, well rugated, and normal cervix/adnexa/uterus without masses or discharge  MS: no gross musculoskeletal defects noted, no edema  SKIN: no suspicious lesions or rashes  NEURO: Normal strength and tone, mentation intact and speech normal  PSYCH: mentation appears normal, affect normal/bright  LYMPH: significant lymphadenopathy in the anterior cervical and preauricular chain.  Mild adenopathy in posterior cervical chain.      Diagnostic Test Results:  Labs reviewed in Epic    ASSESSMENT/PLAN:   Maura was seen today for physical.    Diagnoses and all orders for this visit:    Encounter for gynecological examination without abnormal finding  -     Gynecologic Cytology (PAP Smear)  -     HPV High Risk Types DNA Cervical    Irregular menses  -     US Pelvic Complete with Transvaginal; Future  Patient is experiencing some midcycle spotting.  This may be perimenopause.  Will order an ultrasound to evaluate for fibroid or endometrial polyp or other abnormality.    Encounter for screening mammogram for breast cancer  -     *MA Screening Digital Bilateral; Future    Special screening for malignant neoplasms, colon  -     Adult Gastro Ref - Procedure Only; Future    Lipid screening  -     Lipid Profile (Chol, Trig, HDL, LDL calc); Future  -     Lipid Profile (Chol, Trig, HDL, LDL calc)    Screening for diabetes mellitus  -     Glucose; Future  -     Glucose    Postnasal drip  -     Adult Allergy/Asthma Referral; Future    Other orders  -     INFLUENZA VACCINE IM > 6 MONTHS VALENT IIV4 (AFLURIA/FLUZONE); Future  -      "Pneumococcal vaccine 13 valent PCV13 IM (Prevnar) [[id number]]; Future  -     MA COVID VAC PFIZER DIL RECON 30 MCG/0.3 ML IM      Patient has been advised of split billing requirements and indicates understanding: Yes  COUNSELING:  Reviewed preventive health counseling, as reflected in patient instructions       Regular exercise       Healthy diet/nutrition       Osteoporosis prevention/bone health    Estimated body mass index is 26.27 kg/m  as calculated from the following:    Height as of this encounter: 1.735 m (5' 8.31\").    Weight as of this encounter: 79.1 kg (174 lb 5 oz).    Weight management plan: Discussed healthy diet and exercise guidelines    She reports that she has never smoked. She has never used smokeless tobacco.      Counseling Resources:  ATP IV Guidelines  Pooled Cohorts Equation Calculator  Breast Cancer Risk Calculator  BRCA-Related Cancer Risk Assessment: FHS-7 Tool  FRAX Risk Assessment  ICSI Preventive Guidelines  Dietary Guidelines for Americans, 2010  USDA's MyPlate  ASA Prophylaxis  Lung CA Screening    Gale Rizo MD  [facility]  "

## 2021-09-27 LAB
HUMAN PAPILLOMA VIRUS 16 DNA: POSITIVE
HUMAN PAPILLOMA VIRUS 18 DNA: NEGATIVE
HUMAN PAPILLOMA VIRUS FINAL DIAGNOSIS: ABNORMAL
HUMAN PAPILLOMA VIRUS OTHER HR: NEGATIVE

## 2021-09-28 LAB
BKR LAB AP GYN ADEQUACY: NORMAL
BKR LAB AP GYN INTERPRETATION: NORMAL
BKR LAB AP HPV REFLEX: NORMAL
BKR LAB AP LMP: NORMAL
BKR LAB AP PREVIOUS ABNL DX: NORMAL
BKR LAB AP PREVIOUS ABNORMAL: NORMAL
PATH REPORT.COMMENTS IMP SPEC: NORMAL
PATH REPORT.RELEVANT HX SPEC: NORMAL

## 2021-09-29 ENCOUNTER — PATIENT OUTREACH (OUTPATIENT)
Dept: FAMILY MEDICINE | Facility: CLINIC | Age: 51
End: 2021-09-29
Payer: COMMERCIAL

## 2021-09-30 ENCOUNTER — APPOINTMENT (OUTPATIENT)
Dept: LAB | Facility: CLINIC | Age: 51
End: 2021-09-30
Payer: COMMERCIAL

## 2021-09-30 ENCOUNTER — OFFICE VISIT (OUTPATIENT)
Dept: ALLERGY | Facility: CLINIC | Age: 51
End: 2021-09-30
Attending: FAMILY MEDICINE
Payer: COMMERCIAL

## 2021-09-30 VITALS — OXYGEN SATURATION: 98 % | TEMPERATURE: 98.6 F | HEART RATE: 78 BPM

## 2021-09-30 DIAGNOSIS — J30.1 SEASONAL ALLERGIC RHINITIS DUE TO POLLEN: Primary | ICD-10-CM

## 2021-09-30 DIAGNOSIS — R09.82 POSTNASAL DRIP: ICD-10-CM

## 2021-09-30 PROCEDURE — 99203 OFFICE O/P NEW LOW 30 MIN: CPT | Performed by: ALLERGY & IMMUNOLOGY

## 2021-09-30 PROCEDURE — 36415 COLL VENOUS BLD VENIPUNCTURE: CPT | Performed by: ALLERGY & IMMUNOLOGY

## 2021-09-30 PROCEDURE — 82785 ASSAY OF IGE: CPT | Performed by: ALLERGY & IMMUNOLOGY

## 2021-09-30 PROCEDURE — 86003 ALLG SPEC IGE CRUDE XTRC EA: CPT | Performed by: ALLERGY & IMMUNOLOGY

## 2021-09-30 RX ORDER — FLUTICASONE PROPIONATE 50 MCG
1 SPRAY, SUSPENSION (ML) NASAL DAILY
COMMUNITY
End: 2022-09-26

## 2021-09-30 RX ORDER — DIPHENHYDRAMINE HCL 25 MG
25 CAPSULE ORAL
COMMUNITY
End: 2021-12-21

## 2021-09-30 RX ORDER — FLUTICASONE PROPIONATE 50 MCG
2 SPRAY, SUSPENSION (ML) NASAL DAILY
Qty: 16 G | Refills: 1 | Status: SHIPPED | OUTPATIENT
Start: 2021-09-30 | End: 2021-10-28

## 2021-09-30 NOTE — PROGRESS NOTES
Subjective       HPI chief complaint: Nasal congestion    History of present illness: This is a 51-year-old woman I was asked to see for evaluation by Dr. Chatman in regards to allergies.  Patient notes for the last few months she has been having increased cough, drainage and then snoring.  She also notes a dry mouth.  She states when she coughs she feels that there is a tickle in her throat.  She does have a runny nose.  She has tried Flonase now for the last 3 weeks as well as a nightly Benadryl and that does seem to be helping.  She is never had the symptoms before.  No changes in her environment over the last month.  No cough, wheeze or shortness of breath.    Past medical history: CLL, AVM in the brain      Social history: She lives at home is 89 years old, no carpeting, has had a dog for the last 4 years, she does have a basement that is not finished, does have a little bit of dampness in the basement..      Family history: Positive for allergies and her  and daughter constantly    Review of Systems   Constitutional, HEENT, cardiovascular, pulmonary, gi and gu systems are negative, except as otherwise noted.      Objective    Pulse 78   Temp 98.6  F (37  C)   LMP 09/05/2021 (Within Days)   SpO2 98%   There is no height or weight on file to calculate BMI.  Physical Exam   Gen: Pleasant female not in acute distress  HEENT: Eyes no erythema of the bulbar or palpebral conjunctiva, no edema. Ears: TMs well visualized, no effusions. Nose: No congestion, mucosa normal. Mouth: Throat clear, no lip or tongue edema.   Cardiac: Regular rate and rhythm, no murmurs, rubs or gallops  Respiratory: Clear to auscultation bilaterally, no adventitious breath sounds  Lymph:  Lymphadenopathy on the anterior chain impression report and plan: I impression report and plan:  Skin: No rashes or lesions  Psych: Alert and oriented times 3    Impression report and plan:    1.Rhinitis  Patient took her Benadryl yesterday.,   Check specific IgE to the [place] respiratory disease panel.  Recommend sinus rinses.  Other options included Astelin nasal spray.  I will follow-up with patient once testing returns.

## 2021-09-30 NOTE — LETTER
9/30/2021         RE: Maura Elder  [address]  [place] [address]        Dear Colleague,    Thank you for referring your patient, Maura Elder, to the [facility]. Please see a copy of my visit note below.            Subjective       HPI chief complaint: Nasal congestion    History of present illness: This is a 51-year-old woman I was asked to see for evaluation by Dr. Chatman in regards to allergies.  Patient notes for the last few months she has been having increased cough, drainage and then snoring.  She also notes a dry mouth.  She states when she coughs she feels that there is a tickle in her throat.  She does have a runny nose.  She has tried Flonase now for the last 3 weeks as well as a nightly Benadryl and that does seem to be helping.  She is never had the symptoms before.  No changes in her environment over the last month.  No cough, wheeze or shortness of breath.    Past medical history: CLL, AVM in the brain      Social history: She lives at home is 89 years old, no carpeting, has had a dog for the last 4 years, she does have a basement that is not finished, does have a little bit of dampness in the basement..      Family history: Positive for allergies and her  and daughter constantly    Review of Systems   Constitutional, HEENT, cardiovascular, pulmonary, gi and gu systems are negative, except as otherwise noted.      Objective    Pulse 78   Temp 98.6  F (37  C)   LMP 09/05/2021 (Within Days)   SpO2 98%   There is no height or weight on file to calculate BMI.  Physical Exam   Gen: Pleasant female not in acute distress  HEENT: Eyes no erythema of the bulbar or palpebral conjunctiva, no edema. Ears: TMs well visualized, no effusions. Nose: No congestion, mucosa normal. Mouth: Throat clear, no lip or tongue edema.   Cardiac: Regular rate and rhythm, no murmurs, rubs or gallops  Respiratory: Clear to auscultation bilaterally, no adventitious  breath sounds  Lymph:  Lymphadenopathy on the anterior chain impression report and plan: I impression report and plan:  Skin: No rashes or lesions  Psych: Alert and oriented times 3    Impression report and plan:    1.Rhinitis  Patient took her Benadryl yesterday.,  Check specific IgE to the [place] respiratory disease panel.  Recommend sinus rinses.  Other options included Astelin nasal spray.  I will follow-up with patient once testing returns.                  Again, thank you for allowing me to participate in the care of your patient.        Sincerely,        Clarice BUTTS MD

## 2021-09-30 NOTE — PATIENT INSTRUCTIONS
Fluticasone 2 sprays each nostril daily -- aim out and back    Zander Med Sinus Rinse    Okay to discontinue Benadryl    (Other antihistamines:  Zyrtec, Claritin, Allegra)    Other nasal spray options:  Astelin 2 sprays each nostril twice daily

## 2021-10-01 DIAGNOSIS — J31.0 CHRONIC RHINITIS: ICD-10-CM

## 2021-10-01 DIAGNOSIS — J30.81 ALLERGIC RHINITIS DUE TO ANIMALS: Primary | ICD-10-CM

## 2021-10-01 LAB

## 2021-10-01 RX ORDER — AZELASTINE 1 MG/ML
2 SPRAY, METERED NASAL 2 TIMES DAILY
Qty: 30 ML | Refills: 3 | Status: SHIPPED | OUTPATIENT
Start: 2021-10-01 | End: 2021-12-21

## 2021-10-02 ENCOUNTER — HEALTH MAINTENANCE LETTER (OUTPATIENT)
Age: 51
End: 2021-10-02

## 2021-10-04 ENCOUNTER — MYC MEDICAL ADVICE (OUTPATIENT)
Dept: FAMILY MEDICINE | Facility: CLINIC | Age: 51
End: 2021-10-04

## 2021-10-04 DIAGNOSIS — C91.10 CHRONIC LYMPHOCYTIC LEUKEMIA (H): Primary | ICD-10-CM

## 2021-10-09 ENCOUNTER — MYC MEDICAL ADVICE (OUTPATIENT)
Dept: FAMILY MEDICINE | Facility: CLINIC | Age: 51
End: 2021-10-09

## 2021-10-11 ENCOUNTER — TELEPHONE (OUTPATIENT)
Dept: OBGYN | Facility: CLINIC | Age: 51
End: 2021-10-11

## 2021-10-11 DIAGNOSIS — Z01.812 PRE-PROCEDURE LAB EXAM: Primary | ICD-10-CM

## 2021-10-12 ENCOUNTER — LAB (OUTPATIENT)
Dept: LAB | Facility: CLINIC | Age: 51
End: 2021-10-12
Attending: FAMILY MEDICINE
Payer: COMMERCIAL

## 2021-10-12 DIAGNOSIS — Z01.812 PRE-PROCEDURE LAB EXAM: ICD-10-CM

## 2021-10-12 DIAGNOSIS — Z01.812 PRE-PROCEDURE LAB EXAM: Primary | ICD-10-CM

## 2021-10-12 DIAGNOSIS — C91.10 CHRONIC LYMPHOCYTIC LEUKEMIA (H): ICD-10-CM

## 2021-10-12 LAB — HCG UR QL: NEGATIVE

## 2021-10-12 PROCEDURE — 36415 COLL VENOUS BLD VENIPUNCTURE: CPT

## 2021-10-12 PROCEDURE — 86769 SARS-COV-2 COVID-19 ANTIBODY: CPT | Mod: 90

## 2021-10-12 PROCEDURE — 99000 SPECIMEN HANDLING OFFICE-LAB: CPT

## 2021-10-13 LAB
SARS-COV-2 AB SERPL IA-ACNC: <0.4 U/ML
SARS-COV-2 AB SERPL QL IA: NEGATIVE

## 2021-10-20 ENCOUNTER — HOSPITAL ENCOUNTER (OUTPATIENT)
Dept: ULTRASOUND IMAGING | Facility: CLINIC | Age: 51
Discharge: HOME | End: 2021-10-20
Attending: FAMILY MEDICINE | Admitting: FAMILY MEDICINE
Payer: COMMERCIAL

## 2021-10-20 DIAGNOSIS — N92.6 IRREGULAR MENSES: ICD-10-CM

## 2021-10-20 PROCEDURE — 76830 TRANSVAGINAL US NON-OB: CPT

## 2021-10-26 DIAGNOSIS — J30.1 SEASONAL ALLERGIC RHINITIS DUE TO POLLEN: ICD-10-CM

## 2021-10-28 RX ORDER — FLUTICASONE PROPIONATE 50 MCG
SPRAY, SUSPENSION (ML) NASAL
Qty: 16 ML | Refills: 1 | Status: SHIPPED | OUTPATIENT
Start: 2021-10-28 | End: 2021-12-21

## 2021-11-03 ENCOUNTER — IMMUNIZATION (OUTPATIENT)
Dept: FAMILY MEDICINE | Facility: CLINIC | Age: 51
End: 2021-11-03
Payer: COMMERCIAL

## 2021-11-03 ENCOUNTER — OFFICE VISIT (OUTPATIENT)
Dept: SLEEP MEDICINE | Facility: CLINIC | Age: 51
End: 2021-11-03
Attending: INTERNAL MEDICINE
Payer: COMMERCIAL

## 2021-11-03 DIAGNOSIS — R68.2 DRY MOUTH: ICD-10-CM

## 2021-11-03 DIAGNOSIS — R06.83 SNORING: ICD-10-CM

## 2021-11-03 DIAGNOSIS — G47.10 HYPERSOMNIA: ICD-10-CM

## 2021-11-03 DIAGNOSIS — G47.00 FREQUENT NOCTURNAL AWAKENING: ICD-10-CM

## 2021-11-03 PROCEDURE — 90472 IMMUNIZATION ADMIN EACH ADD: CPT

## 2021-11-03 PROCEDURE — 90471 IMMUNIZATION ADMIN: CPT

## 2021-11-03 PROCEDURE — 90682 RIV4 VACC RECOMBINANT DNA IM: CPT

## 2021-11-03 PROCEDURE — 90670 PCV13 VACCINE IM: CPT

## 2021-11-03 PROCEDURE — 95800 SLP STDY UNATTENDED: CPT | Performed by: INTERNAL MEDICINE

## 2021-11-03 NOTE — PROGRESS NOTES
Device has been registered and shipped via [email or web address] on 11/03/21. Patient was notified that package was mailed out. Watch [facility] serial number [id number].

## 2021-11-22 ENCOUNTER — TELEPHONE (OUTPATIENT)
Dept: SLEEP MEDICINE | Facility: CLINIC | Age: 51
End: 2021-11-22
Payer: COMMERCIAL

## 2021-11-22 DIAGNOSIS — G47.33 OBSTRUCTIVE SLEEP APNEA: Primary | ICD-10-CM

## 2021-11-22 NOTE — PROGRESS NOTES
Watch pat has been scored using rule 1B, 4%.   Patient to follow up with provider to determine appropriate therapy.     Pat AHI: 11.8    Ordering Provider: DO Demetria Morales [facility], [facility]  Sleep Technologist  11/22/21

## 2021-11-22 NOTE — PROCEDURES
"WatchPAT - HOME SLEEP STUDY INTERPRETATION    Patient: Maura Elder  MRN: [id number]  YOB: 1970  Study Date: 11/20/21  Referring Provider: Pa Rizo MD  Ordering Provider: Raymundo Mariano DO    Chain of custody patient verification was not enabled.  Chain of custody verification was not present throughout the entire study.     Indications for Home Study: Maura Elder is a 51 year old female who presents with symptoms suggestive of obstructive sleep apnea.    Estimated body mass index is 26.27 kg/m  as calculated from the following:    Height as of 9/23/21: 1.735 m (5' 8.31\").    Weight as of 9/23/21: 79.1 kg (174 lb 5 oz).  Total score - [place]: 4 (8/9/2021 10:00 AM)    Data: A full night home sleep study was performed recording the standard physiologic parameters including peripheral arterial tonometry (PAT), sound/snoring, body position,  movement, sound, and oxygen saturation by pulse oximetry. Pulse rate was estimated by oximetry recording. Sleep staging (wake, REM, light, and deep sleep) was derived from PAT signal.  This study was considered adequate based on > 4 hours of quality oximetry and respiratory recording. As specified by the AASM Manual for the Scoring of Sleep and Associated events, version 2.3, Rule VIII.D 1B, 4% oxygen desaturation scoring for hypopneas is used as a standard of care on all home sleep apnea testing.    Total Recording Time: 9 hrs, 57 min  Total Sleep Time: 9 hrs, 12 min  % of Sleep Time REM: 37.6%    Respiratory:  Snoring: Snoring was present.  Respiratory events: The PAT respiratory disturbance index [pRDI] was 16.1 events per hour.  The PAT apnea/hypopnea index [pAHI] was 11.8 events per hour.  SUMMER was 9.2 events per hour.  During REM sleep the pAHI was 37.6.  Sleep Associated Hypoxemia: sustained hypoxemia was not present. Mean oxygen saturation was 95%.  Minimum was 86%.  Time with saturation less than 88% was 0.6 minutes.    Heart Rate: By " pulse oximetry normal rate was noted.     Position: Percent of time spent: supine - 35.1%, prone - 0%, on right - 5.7%, on left - 59.2%.  pAHI was 20.6 per hour supine, N/A per hour prone, 25.2 per hour on right side, and 5 per hour on left side.     Assessment:   Mild obstructive sleep apnea.  Sleep associated hypoxemia was not present.    Recommendations:  Consider auto-CPAP at 5-15 cmH2O, oral appliance therapy or positional therapy.  Suggest optimizing sleep hygiene and avoiding sleep deprivation.  Weight management.    Diagnosis Code(s): Obstructive Sleep Apnea G47.33    Raymundo Mariano DO, November 22, 2021   Diplomate, American Board of Internal Medicine, Sleep Medicine

## 2021-11-22 NOTE — TELEPHONE ENCOUNTER
Discussed with patient the results of the sleep study by phone. However she does not want to make a rush decision at this time. Please call the patient to schedule a follow up visit to review the results with me on video. Thanks.

## 2021-12-03 ENCOUNTER — HOSPITAL ENCOUNTER (OUTPATIENT)
Dept: MAMMOGRAPHY | Facility: CLINIC | Age: 51
Discharge: HOME | End: 2021-12-03
Attending: FAMILY MEDICINE | Admitting: FAMILY MEDICINE
Payer: COMMERCIAL

## 2021-12-03 PROCEDURE — 77067 SCR MAMMO BI INCL CAD: CPT

## 2021-12-15 ENCOUNTER — HOSPITAL ENCOUNTER (OUTPATIENT)
Dept: ULTRASOUND IMAGING | Facility: CLINIC | Age: 51
End: 2021-12-15
Attending: FAMILY MEDICINE
Payer: COMMERCIAL

## 2021-12-15 ENCOUNTER — HOSPITAL ENCOUNTER (OUTPATIENT)
Dept: MAMMOGRAPHY | Facility: CLINIC | Age: 51
End: 2021-12-15
Attending: FAMILY MEDICINE
Payer: COMMERCIAL

## 2021-12-15 DIAGNOSIS — N64.89 BREAST ASYMMETRY: ICD-10-CM

## 2021-12-15 PROCEDURE — 77061 BREAST TOMOSYNTHESIS UNI: CPT | Mod: RT

## 2021-12-15 PROCEDURE — 76642 ULTRASOUND BREAST LIMITED: CPT | Mod: RT

## 2021-12-21 ENCOUNTER — OFFICE VISIT (OUTPATIENT)
Dept: OBGYN | Facility: CLINIC | Age: 51
End: 2021-12-21
Payer: COMMERCIAL

## 2021-12-21 VITALS
OXYGEN SATURATION: 97 % | WEIGHT: 175 LBS | SYSTOLIC BLOOD PRESSURE: 118 MMHG | BODY MASS INDEX: 26.37 KG/M2 | DIASTOLIC BLOOD PRESSURE: 64 MMHG | HEART RATE: 86 BPM

## 2021-12-21 DIAGNOSIS — Z01.812 PRE-PROCEDURE LAB EXAM: ICD-10-CM

## 2021-12-21 DIAGNOSIS — R87.810 CERVICAL HIGH RISK HUMAN PAPILLOMAVIRUS (HPV) DNA TEST POSITIVE: Primary | ICD-10-CM

## 2021-12-21 LAB — HCG UR QL: NEGATIVE

## 2021-12-21 PROCEDURE — 57455 BIOPSY OF CERVIX W/SCOPE: CPT | Performed by: OBSTETRICS & GYNECOLOGY

## 2021-12-21 PROCEDURE — 88305 TISSUE EXAM BY PATHOLOGIST: CPT | Performed by: PATHOLOGY

## 2021-12-21 PROCEDURE — 81025 URINE PREGNANCY TEST: CPT | Performed by: OBSTETRICS & GYNECOLOGY

## 2021-12-21 NOTE — PROGRESS NOTES
Colposcopy Procedure Note    Indications: Pap smear on 9/23/21 showed NILM with type 16 HRHPV.  Pertinent past history includes several abnormal Paps.  In 2014 she had ASCUS with type 16 HPV; colposcopy had koilocytosis in several biopsies with a normal ECC.  In 2015 she had LSIL with type 16; colposcopy biopsy at 6:00 showed LOIS I.  Same Pap results in 2016; colposcopy without biopsy but with visual results c/w LOIS I.  Pap 12/22/17 NILM with negative HPV.  Pap 2/7/2020 NILM with negative HPV but some endometrial cells.  No sampling was done.    Procedure Details   /64 (BP Location: Right arm, Patient Position: Sitting, Cuff Size: Adult Regular)   Pulse 86   Wt 79.4 kg (175 lb)   LMP 11/05/2021 (Exact Date)   Body mass index is 26.37 kg/m .    The reason for procedure is explained, and informed consent is obtained.  Urine hCG is negative.    Speculum is placed in the vagina and visualization of cervix is achieved. The cervix is swabbed with 3% acetic acid solution. Transformation zone is easily seen.  Green filter is applied with no abnormal vessels seen.  Acetowhite epithelium is seen at 7:00.  Biopsy is taken at 7:00.  ECC is not done.  Adequate colposcopy.  The patient tolerated the procedure well.          Impression: LOIS I    Plan: Post procedure instructions are reviewed.  She will be notified when the biopsy results are available.  The role of HPV in causing cervical pap smear abnormalities, dysplasia, and cancer is reviewed with the patient. We discussed the role of the immune system and ways to help keep it strong.

## 2021-12-23 LAB
PATH REPORT.COMMENTS IMP SPEC: NORMAL
PATH REPORT.FINAL DX SPEC: NORMAL
PATH REPORT.GROSS SPEC: NORMAL
PATH REPORT.MICROSCOPIC SPEC OTHER STN: NORMAL
PATH REPORT.RELEVANT HX SPEC: NORMAL
PHOTO IMAGE: NORMAL

## 2022-01-20 ENCOUNTER — PATIENT OUTREACH (OUTPATIENT)
Dept: FAMILY MEDICINE | Facility: CLINIC | Age: 52
End: 2022-01-20
Payer: COMMERCIAL

## 2022-01-20 DIAGNOSIS — R87.810 ASCUS WITH POSITIVE HIGH RISK HPV CERVICAL: ICD-10-CM

## 2022-01-20 DIAGNOSIS — R87.610 ASCUS WITH POSITIVE HIGH RISK HPV CERVICAL: ICD-10-CM

## 2022-09-04 ENCOUNTER — HEALTH MAINTENANCE LETTER (OUTPATIENT)
Age: 52
End: 2022-09-04

## 2022-09-26 ENCOUNTER — OFFICE VISIT (OUTPATIENT)
Dept: FAMILY MEDICINE | Facility: CLINIC | Age: 52
End: 2022-09-26
Payer: COMMERCIAL

## 2022-09-26 VITALS
BODY MASS INDEX: 27.63 KG/M2 | WEIGHT: 182.3 LBS | SYSTOLIC BLOOD PRESSURE: 108 MMHG | HEIGHT: 68 IN | OXYGEN SATURATION: 100 % | HEART RATE: 84 BPM | DIASTOLIC BLOOD PRESSURE: 60 MMHG

## 2022-09-26 DIAGNOSIS — Z23 NEED FOR INFLUENZA VACCINATION: ICD-10-CM

## 2022-09-26 DIAGNOSIS — Z12.4 CERVICAL CANCER SCREENING: ICD-10-CM

## 2022-09-26 DIAGNOSIS — Z12.11 SCREEN FOR COLON CANCER: ICD-10-CM

## 2022-09-26 DIAGNOSIS — Z23 NEED FOR COVID-19 VACCINE: ICD-10-CM

## 2022-09-26 DIAGNOSIS — M79.10 MYALGIA: ICD-10-CM

## 2022-09-26 DIAGNOSIS — Z00.00 ROUTINE GENERAL MEDICAL EXAMINATION AT A HEALTH CARE FACILITY: Primary | ICD-10-CM

## 2022-09-26 DIAGNOSIS — Z13.220 LIPID SCREENING: ICD-10-CM

## 2022-09-26 PROBLEM — E04.1 NONTOXIC AUTONOMOUS THYROID NODULE: Status: ACTIVE | Noted: 2022-09-26

## 2022-09-26 LAB
ANION GAP SERPL CALCULATED.3IONS-SCNC: 9 MMOL/L (ref 7–15)
BUN SERPL-MCNC: 16.6 MG/DL (ref 6–20)
CALCIUM SERPL-MCNC: 9.9 MG/DL (ref 8.6–10)
CHLORIDE SERPL-SCNC: 99 MMOL/L (ref 98–107)
CHOLEST SERPL-MCNC: 246 MG/DL
CK SERPL-CCNC: 28 U/L (ref 26–192)
CREAT SERPL-MCNC: 0.62 MG/DL (ref 0.51–0.95)
DEPRECATED HCO3 PLAS-SCNC: 31 MMOL/L (ref 22–29)
GFR SERPL CREATININE-BSD FRML MDRD: >90 ML/MIN/1.73M2
GLUCOSE SERPL-MCNC: 104 MG/DL (ref 70–99)
HDLC SERPL-MCNC: 44 MG/DL
LDLC SERPL CALC-MCNC: 153 MG/DL
MAGNESIUM SERPL-MCNC: 2.2 MG/DL (ref 1.7–2.3)
NONHDLC SERPL-MCNC: 202 MG/DL
POTASSIUM SERPL-SCNC: 4.3 MMOL/L (ref 3.4–5.3)
SODIUM SERPL-SCNC: 139 MMOL/L (ref 136–145)
TRIGL SERPL-MCNC: 247 MG/DL
URATE SERPL-MCNC: 2.6 MG/DL (ref 2.4–5.7)

## 2022-09-26 PROCEDURE — 90682 RIV4 VACC RECOMBINANT DNA IM: CPT | Performed by: FAMILY MEDICINE

## 2022-09-26 PROCEDURE — 82550 ASSAY OF CK (CPK): CPT | Performed by: FAMILY MEDICINE

## 2022-09-26 PROCEDURE — 84550 ASSAY OF BLOOD/URIC ACID: CPT | Performed by: FAMILY MEDICINE

## 2022-09-26 PROCEDURE — 0124A COVID-19,PF,PFIZER BOOSTER BIVALENT: CPT | Performed by: FAMILY MEDICINE

## 2022-09-26 PROCEDURE — 36415 COLL VENOUS BLD VENIPUNCTURE: CPT | Performed by: FAMILY MEDICINE

## 2022-09-26 PROCEDURE — 80061 LIPID PANEL: CPT | Performed by: FAMILY MEDICINE

## 2022-09-26 PROCEDURE — 80048 BASIC METABOLIC PNL TOTAL CA: CPT | Performed by: FAMILY MEDICINE

## 2022-09-26 PROCEDURE — 99213 OFFICE O/P EST LOW 20 MIN: CPT | Mod: 25 | Performed by: FAMILY MEDICINE

## 2022-09-26 PROCEDURE — 83735 ASSAY OF MAGNESIUM: CPT | Performed by: FAMILY MEDICINE

## 2022-09-26 PROCEDURE — 91312 COVID-19,PF,PFIZER BOOSTER BIVALENT: CPT | Performed by: FAMILY MEDICINE

## 2022-09-26 PROCEDURE — 90471 IMMUNIZATION ADMIN: CPT | Performed by: FAMILY MEDICINE

## 2022-09-26 PROCEDURE — 86618 LYME DISEASE ANTIBODY: CPT | Performed by: FAMILY MEDICINE

## 2022-09-26 PROCEDURE — 99396 PREV VISIT EST AGE 40-64: CPT | Mod: 25 | Performed by: FAMILY MEDICINE

## 2022-09-26 RX ORDER — VENETOCLAX 100 MG/1
100 TABLET, FILM COATED ORAL DAILY
COMMUNITY
Start: 2022-09-06

## 2022-09-26 RX ORDER — ALLOPURINOL 300 MG/1
1 TABLET ORAL DAILY
COMMUNITY
Start: 2022-09-04

## 2022-09-26 ASSESSMENT — ENCOUNTER SYMPTOMS
JOINT SWELLING: 0
DIZZINESS: 0
CHILLS: 0
SHORTNESS OF BREATH: 0
FEVER: 0
COUGH: 0
PALPITATIONS: 0
ABDOMINAL PAIN: 0
BREAST MASS: 0
NAUSEA: 0
EYE PAIN: 0
CONSTIPATION: 0
MYALGIAS: 1
WEAKNESS: 0
HEADACHES: 0
DYSURIA: 0
HEARTBURN: 0
HEMATURIA: 0
ARTHRALGIAS: 0
NERVOUS/ANXIOUS: 0
PARESTHESIAS: 0
SORE THROAT: 0
FREQUENCY: 0
HEMATOCHEZIA: 0
DIARRHEA: 0

## 2022-09-26 NOTE — PROGRESS NOTES
SUBJECTIVE:   CC: Lora is an 52 year old who presents for preventive health visit.       Patient has been advised of split billing requirements and indicates understanding: Yes  Healthy Habits:     Getting at least 3 servings of Calcium per day:  Yes    Bi-annual eye exam:  Yes    Dental care twice a year:  Yes    Sleep apnea or symptoms of sleep apnea:  None    Diet:  Regular (no restrictions)    Frequency of exercise:  2-3 days/week    Duration of exercise:  15-30 minutes    Taking medications regularly:  Yes    Barriers to taking medications:  None    Medication side effects:  Muscle aches    PHQ-2 Total Score: 0    Additional concerns today:  Yes    As an update:  2019 - cerebral AVM - radiation. - resolved.  There is one more aneurysm that is being monitored by Dr. Farris.    CLL: Working with Dr. Dyson.  Started treatment with chemo due to rising wbc numbers and lymphadenopathy.  Significant improvement on IV chemo.  Now on venclexta oral x 5 months.  Symptoms have been mild.  A lot of fatigue.  Trying to walk around the block.  Has had mild nausea, no vomiting.  On steroids as well, leading to weight gain.  After IV infusions, would get significant thrombocytopenia.  At times down to 17k, now up to 65k.      In the last week: 9/15/2022: bilateral forearm pain.  CT scan: when contrast went in, burning (which is different)  Went home a little nauseous.  Friday: felt the same.  Saturday: tired  Sunday: mouth sore  Monday, met with Dr. Dyson, got magic mouthwash.  Stopped the venetoclax, but also stopped the allopurinol  Thursday: felt an excruciating arm pain from fingers to the elbow, worse at night.  Left worse than right, but it is bilateral.  Deep ache. No numbness or tingling.  No rash or skin color change.  Called Dr. Dyson's office: treated with steroids and hydrocodone.    Saturday night: took hydrocodone, did nothing.  Excruciating at night.  Restarted on allopurinol and steroid yesterday, slept well.      Colon cancer screening: no family history.  Parents have had colon polyps removed from time to time.  Recent thrombocytopenia from chemo.  Will place a referral, but will require clearance from hematology for thrombocytopenia prior to proceeding.    Menses: LMP: Anthony 2021. Had a little spotting in March.  Had an actual menses 8/17/2022.  Hot flashes vary sporadically.  Had hot flashes last summer.  Lately, just gets a little warm.  For 5 minutes.  Last Pap Smear: 9/23/2021 - normal, positive HPV - colposcopy 12/21/2021 LOIS 1.  Due for follow up in 1 year.  Abnormal Pap: per above.      The 10-year ASCVD risk score (Pb ROGERS Jr., et al., 2013) is: 2.1%    Values used to calculate the score:      Age: 52 years      Sex: Female      Is Non- : No      Diabetic: No      Tobacco smoker: No      Systolic Blood Pressure: 108 mmHg      Is BP treated: No      HDL Cholesterol: 39 mg/dL      Total Cholesterol: 250 mg/dL   Using 2021 numbers.        Today's PHQ-2 Score:   PHQ-2 ( 1999 Pfizer) 9/26/2022   Q1: Little interest or pleasure in doing things 0   Q2: Feeling down, depressed or hopeless 0   PHQ-2 Score 0   PHQ-2 Total Score (12-17 Years)- Positive if 3 or more points; Administer PHQ-A if positive -   Q1: Little interest or pleasure in doing things Not at all   Q2: Feeling down, depressed or hopeless Not at all   PHQ-2 Score 0       Abuse: Current or Past (Physical, Sexual or Emotional) - No  Do you feel safe in your environment? Yes        Social History     Tobacco Use     Smoking status: Never Smoker     Smokeless tobacco: Never Used   Substance Use Topics     Alcohol use: Yes     Alcohol/week: 1.0 standard drink     If you drink alcohol do you typically have >3 drinks per day or >7 drinks per week? No    Alcohol Use 9/26/2022   Prescreen: >3 drinks/day or >7 drinks/week? No   No flowsheet data found.    Reviewed orders with patient.  Reviewed health maintenance and updated orders  accordingly - Yes  Labs reviewed in EPIC    Breast Cancer Screening:    Breast CA Risk Assessment (FHS-7) 2022   Do you have a family history of breast, colon, or ovarian cancer? No / Unknown         Mammogram Screening: Recommended annual mammography  Pertinent mammograms are reviewed under the imaging tab.    History of abnormal Pap smear: YES - updated in Problem List and Health Maintenance accordingly  PAP / HPV Latest Ref Rng & Units 2017   PAP   Negative for Intraepithelial Lesion or Malignancy (NILM) Negative for squamous intraepithelial lesion or malignancy  Electronically signed by Eladia Solano CT (ASCP) on 2020 at  3:08 PM   Negative for squamous intraepithelial lesion or malignancy  Electronically signed by Eladia Solano CT (ASCP) on 2018 at 12:43 PM     HPV16 Negative Positive(A) Negative Negative   HPV18 Negative Negative Negative Negative   HRHPV Negative Negative Negative Negative     Reviewed and updated as needed this visit by clinical staff   Tobacco  Allergies  Meds                Reviewed and updated as needed this visit by Provider                   Past Medical History:   Diagnosis Date     ASCUS with positive high risk HPV cervical 2014,  NIL paps 14 ASCUS, +HR HPV 16 14 Colpo bx suspicious for HPV effect, inflammation, ECC neg 11/11/15 LSIL, +HR HPV 16 12/3/15 Colpo bx LOIS I 16 LSIL, +HR HPV 16 17 Colpo visually consistent with LOIS I, no bx 17 NIL pap, neg HPV 20 NIL pap, neg HPV, EM cells present 21 NIL pap, +HR HPV 16. Plan: colposcopy due before 21     AVM (arteriovenous malformation) brain      LOIS I (cervical intraepithelial neoplasia I)      HPV (human papilloma virus) infection      Leukemia (H)     CLL     Shingles       Past Surgical History:   Procedure Laterality Date     APPENDECTOMY  2012      SECTION  2011     IR CEREBRAL ANGIOGRAM  2019      "IR CEREBRAL ANGIOGRAM  6/21/2019     [facility] SLEEP STUDY, UNATTENDED, RECORD HEART RATE/O2 SAT/RESP ANAL/SLEEP TM  11/22/2021            Review of Systems   Constitutional: Negative for chills and fever.   HENT: Negative for congestion, ear pain, hearing loss and sore throat.    Eyes: Negative for pain and visual disturbance.   Respiratory: Negative for cough and shortness of breath.    Cardiovascular: Negative for chest pain, palpitations and peripheral edema.   Gastrointestinal: Negative for abdominal pain, constipation, diarrhea, heartburn, hematochezia and nausea.   Breasts:  Negative for tenderness, breast mass and discharge.   Genitourinary: Negative for dysuria, frequency, genital sores, hematuria, pelvic pain, urgency, vaginal bleeding and vaginal discharge.   Musculoskeletal: Positive for myalgias. Negative for arthralgias and joint swelling.   Skin: Negative for rash.   Neurological: Negative for dizziness, weakness, headaches and paresthesias.   Psychiatric/Behavioral: Negative for mood changes. The patient is not nervous/anxious.           OBJECTIVE:   /60 (BP Location: Left arm, Patient Position: Sitting, Cuff Size: Adult Regular)   Pulse 84   Ht 1.727 m (5' 8\")   Wt 82.7 kg (182 lb 4.8 oz)   SpO2 100%   BMI 27.72 kg/m    Physical Exam  GENERAL: healthy, alert and no distress  EYES: Eyes grossly normal to inspection, PERRL and conjunctivae and sclerae normal  HENT: ear canals and TM's normal, nose and mouth without ulcers or lesions  NECK: no adenopathy, no asymmetry, masses, or scars and thyroid normal to palpation  RESP: lungs clear to auscultation - no rales, rhonchi or wheezes  BREAST: normal without masses, tenderness or nipple discharge and no palpable axillary masses or adenopathy  CV: regular rate and rhythm, normal S1 S2, no S3 or S4, no murmur, click or rub, no peripheral edema and peripheral pulses strong  ABDOMEN: soft, nontender, no hepatosplenomegaly, no masses and bowel sounds " normal  MS: no gross musculoskeletal defects noted, no edema  SKIN: no suspicious lesions or rashes  NEURO: Normal strength and tone, mentation intact and speech normal  PSYCH: mentation appears normal, affect normal/bright    Labs reviewed in Epic    ASSESSMENT/PLAN:   Maura was seen today for physical.    Diagnoses and all orders for this visit:    Routine general medical examination at a health care facility  Updated medical history and allergies.    Screen for colon cancer  -     Colonoscopy Screening  Referral; Future  Discussed the need to get clearance from hematology prior to proceeding with colonoscopy due to her thrombocytopenia.    Cervical cancer screening  LOIS 1 on colposcopy 12/21/2021.  Patient will follow up with pap smear at 1 year.    Need for COVID-19 vaccine  -     COVID-19,PF,PFIZER BOOSTER BIVALENT (12+YRS)    Need for influenza vaccination  -     INFLUENZA QUAD, RECOMBINANT, P-FREE (RIV4) (FLUBLOK) AGE 50-64 [IFM500]    Myalgia  -     Uric acid; Future  -     CK total; Future  -     Basic metabolic panel  (Ca, Cl, CO2, Creat, Gluc, K, Na, BUN); Future  -     Lyme Disease Total Abs Bld with Reflex to Confirm CLIA; Future  -     Magnesium; Future  -     Uric acid  -     CK total  -     Basic metabolic panel  (Ca, Cl, CO2, Creat, Gluc, K, Na, BUN)  -     Lyme Disease Total Abs Bld with Reflex to Confirm CLIA  -     Magnesium  Bilateral forearms.  Unclear etiology. Only occurs at night since starting on her oral chemo therapy.  Will check labs.  May be a side effect of the chemo.    Lipid screening  -     Lipid Profile (Chol, Trig, HDL, LDL calc); Future  -     Lipid Profile (Chol, Trig, HDL, LDL calc)    Other orders  -     REVIEW OF HEALTH MAINTENANCE PROTOCOL ORDERS            COUNSELING:  Reviewed preventive health counseling, as reflected in patient instructions       Regular exercise       Healthy diet/nutrition       Colorectal Cancer Screening    Estimated body mass index is  "27.72 kg/m  as calculated from the following:    Height as of this encounter: 1.727 m (5' 8\").    Weight as of this encounter: 82.7 kg (182 lb 4.8 oz).        She reports that she has never smoked. She has never used smokeless tobacco.      Counseling Resources:  ATP IV Guidelines  Pooled Cohorts Equation Calculator  Breast Cancer Risk Calculator  BRCA-Related Cancer Risk Assessment: FHS-7 Tool  FRAX Risk Assessment  ICSI Preventive Guidelines  Dietary Guidelines for Americans, 2010  USDA's MyPlate  ASA Prophylaxis  Lung CA Screening    Gale Rizo MD  [facility]  "

## 2022-09-27 LAB — B BURGDOR IGG+IGM SER QL: 0.01

## 2022-12-06 ENCOUNTER — PATIENT OUTREACH (OUTPATIENT)
Dept: FAMILY MEDICINE | Facility: CLINIC | Age: 52
End: 2022-12-06

## 2022-12-06 NOTE — LETTER
December 6, 2022      Maura Elder  [address]  [place] [address]        Dear ,    This letter is to remind you that you are due for your follow-up Pap smear and Human Papillomavirus (HPV) test.    Please call [phone] to schedule your appointment at your earliest convenience.    If you have completed the appointment outside of the [facility] system, please have the records forwarded to our office. We will update your chart for your provider to review before your next annual wellness visit.     Thank you for choosing [facility]!      Sincerely,    Your [facility] Care Team

## 2023-01-26 NOTE — TELEPHONE ENCOUNTER
Hi Dr. Perez's,  Sending to you as a fyi since Dr. Rizo is no longer with [facility]. Patient is lost to pap tracking follow-up. Attempts to contact pt have been made per reminder process and there has been no reply and/or no appt scheduled.  If you are wanting any additional contact attempts please send to your care team staff.     Pap Hx:  2010, 2011 NIL paps  9/25/14 ASCUS, +HR HPV 16  11/14/14 Colpo bx suspicious for HPV effect, inflammation, ECC neg  11/11/15 LSIL, +HR HPV 16  12/3/15 Colpo bx LOIS I  12/21/16 LSIL, +HR HPV 16  2/23/17 Colpo visually consistent with LOIS I, no bx  12/22/17 NIL pap, neg HPV  2/7/20 NIL pap, neg HPV, EM cells present  9/23/21 NIL pap, +HR HPV 16. Plan: colposcopy due before 12/23/21 - [facility]  12/21/21 [place]:  Atypia, Neg for dysplasia. Plan: cotest in 1 year   12/6/22 Reminder [place], Letter   1/2/23 Reminder call-lm  01/26/23 Lost to follow-up for pap tracking, fyi routed to provider

## 2023-04-29 ENCOUNTER — HEALTH MAINTENANCE LETTER (OUTPATIENT)
Age: 53
End: 2023-04-29

## 2023-06-26 ENCOUNTER — HOSPITAL ENCOUNTER (OUTPATIENT)
Dept: CT IMAGING | Facility: CLINIC | Age: 53
Discharge: HOME | End: 2023-06-26
Attending: INTERNAL MEDICINE
Payer: COMMERCIAL

## 2023-06-26 ENCOUNTER — APPOINTMENT (OUTPATIENT)
Dept: LAB | Facility: CLINIC | Age: 53
End: 2023-06-26
Attending: INTERNAL MEDICINE
Payer: COMMERCIAL

## 2023-06-26 DIAGNOSIS — C91.10 CHRONIC LYMPHOID LEUKEMIA, WITHOUT MENTION OF HAVING ACHIEVED REMISSION(204.10) (H): ICD-10-CM

## 2023-06-26 LAB — HCG UR QL: NEGATIVE

## 2023-06-26 PROCEDURE — 250N000011 HC RX IP 250 OP 636: Mod: JZ | Performed by: INTERNAL MEDICINE

## 2023-06-26 PROCEDURE — 74177 CT ABD & PELVIS W/CONTRAST: CPT

## 2023-06-26 PROCEDURE — 81025 URINE PREGNANCY TEST: CPT | Performed by: RADIOLOGY

## 2023-06-26 RX ORDER — IOPAMIDOL 755 MG/ML
100 INJECTION, SOLUTION INTRAVASCULAR ONCE
Status: COMPLETED | OUTPATIENT
Start: 2023-06-26 | End: 2023-06-26

## 2023-06-26 RX ADMIN — IOPAMIDOL 90 ML: 755 INJECTION, SOLUTION INTRAVENOUS at 09:01

## 2023-11-10 ENCOUNTER — HOSPITAL ENCOUNTER (OUTPATIENT)
Dept: CT IMAGING | Facility: CLINIC | Age: 53
Discharge: HOME | End: 2023-11-10
Attending: INTERNAL MEDICINE | Admitting: INTERNAL MEDICINE
Payer: COMMERCIAL

## 2023-11-10 DIAGNOSIS — C91.10 CLL (CHRONIC LYMPHOCYTIC LEUKEMIA) (H): ICD-10-CM

## 2023-11-10 PROCEDURE — 71260 CT THORAX DX C+: CPT

## 2023-11-10 PROCEDURE — 250N000011 HC RX IP 250 OP 636: Mod: JZ | Performed by: INTERNAL MEDICINE

## 2023-11-10 RX ORDER — IOPAMIDOL 755 MG/ML
90 INJECTION, SOLUTION INTRAVASCULAR ONCE
Status: COMPLETED | OUTPATIENT
Start: 2023-11-10 | End: 2023-11-10

## 2023-11-10 RX ADMIN — IOPAMIDOL 90 ML: 755 INJECTION, SOLUTION INTRAVENOUS at 08:46

## 2023-12-09 ENCOUNTER — HEALTH MAINTENANCE LETTER (OUTPATIENT)
Age: 53
End: 2023-12-09

## 2024-02-17 ENCOUNTER — HEALTH MAINTENANCE LETTER (OUTPATIENT)
Age: 54
End: 2024-02-17

## 2024-03-18 ENCOUNTER — HOSPITAL ENCOUNTER (OUTPATIENT)
Dept: CT IMAGING | Facility: CLINIC | Age: 54
Discharge: HOME | End: 2024-03-18
Attending: PHYSICIAN ASSISTANT | Admitting: PHYSICIAN ASSISTANT
Payer: COMMERCIAL

## 2024-03-18 DIAGNOSIS — C91.10 CLL (CHRONIC LYMPHOCYTIC LEUKEMIA) (H): ICD-10-CM

## 2024-03-18 PROCEDURE — 250N000011 HC RX IP 250 OP 636: Performed by: PHYSICIAN ASSISTANT

## 2024-03-18 PROCEDURE — 71260 CT THORAX DX C+: CPT

## 2024-03-18 RX ORDER — IOPAMIDOL 755 MG/ML
90 INJECTION, SOLUTION INTRAVASCULAR ONCE
Status: COMPLETED | OUTPATIENT
Start: 2024-03-18 | End: 2024-03-18

## 2024-03-18 RX ADMIN — IOPAMIDOL 90 ML: 755 INJECTION, SOLUTION INTRAVENOUS at 15:33

## 2024-10-28 ENCOUNTER — HOSPITAL ENCOUNTER (OUTPATIENT)
Dept: CT IMAGING | Facility: CLINIC | Age: 54
Discharge: HOME | End: 2024-10-28
Attending: INTERNAL MEDICINE | Admitting: INTERNAL MEDICINE
Payer: COMMERCIAL

## 2024-10-28 DIAGNOSIS — C91.10 CHRONIC LYMPHOID LEUKEMIA (H): ICD-10-CM

## 2024-10-28 PROCEDURE — 250N000011 HC RX IP 250 OP 636: Performed by: INTERNAL MEDICINE

## 2024-10-28 PROCEDURE — 74177 CT ABD & PELVIS W/CONTRAST: CPT

## 2024-10-28 RX ORDER — IOPAMIDOL 755 MG/ML
90 INJECTION, SOLUTION INTRAVASCULAR ONCE
Status: COMPLETED | OUTPATIENT
Start: 2024-10-28 | End: 2024-10-28

## 2024-10-28 RX ADMIN — IOPAMIDOL 90 ML: 755 INJECTION, SOLUTION INTRAVENOUS at 09:24

## 2025-01-11 ENCOUNTER — HEALTH MAINTENANCE LETTER (OUTPATIENT)
Age: 55
End: 2025-01-11

## 2025-04-15 ENCOUNTER — PATIENT OUTREACH (OUTPATIENT)
Dept: CARE COORDINATION | Facility: CLINIC | Age: 55
End: 2025-04-15
Payer: COMMERCIAL

## 2025-04-23 ENCOUNTER — HOSPITAL ENCOUNTER (OUTPATIENT)
Dept: MAMMOGRAPHY | Facility: CLINIC | Age: 55
Discharge: HOME | End: 2025-04-23
Attending: FAMILY MEDICINE
Payer: COMMERCIAL

## 2025-04-23 DIAGNOSIS — Z12.31 VISIT FOR SCREENING MAMMOGRAM: ICD-10-CM

## 2025-04-23 PROCEDURE — 77063 BREAST TOMOSYNTHESIS BI: CPT

## 2025-06-30 ENCOUNTER — HOSPITAL ENCOUNTER (OUTPATIENT)
Dept: CT IMAGING | Facility: CLINIC | Age: 55
Discharge: HOME | End: 2025-06-30
Attending: INTERNAL MEDICINE | Admitting: INTERNAL MEDICINE
Payer: COMMERCIAL

## 2025-06-30 DIAGNOSIS — C91.10 CLL (CHRONIC LYMPHOCYTIC LEUKEMIA) (H): ICD-10-CM

## 2025-06-30 PROCEDURE — 74177 CT ABD & PELVIS W/CONTRAST: CPT

## 2025-06-30 PROCEDURE — 250N000011 HC RX IP 250 OP 636: Performed by: INTERNAL MEDICINE

## 2025-06-30 RX ORDER — IOPAMIDOL 755 MG/ML
90 INJECTION, SOLUTION INTRAVASCULAR ONCE
Status: COMPLETED | OUTPATIENT
Start: 2025-06-30 | End: 2025-06-30

## 2025-06-30 RX ADMIN — IOPAMIDOL 90 ML: 755 INJECTION, SOLUTION INTRAVENOUS at 08:48
